# Patient Record
Sex: MALE | Race: WHITE | NOT HISPANIC OR LATINO | Employment: OTHER | URBAN - METROPOLITAN AREA
[De-identification: names, ages, dates, MRNs, and addresses within clinical notes are randomized per-mention and may not be internally consistent; named-entity substitution may affect disease eponyms.]

---

## 2023-07-20 PROBLEM — R06.09 DYSPNEA ON EXERTION: Status: ACTIVE | Noted: 2023-07-20

## 2023-07-20 PROBLEM — I50.22 CHRONIC SYSTOLIC CONGESTIVE HEART FAILURE (MULTI): Status: ACTIVE | Noted: 2023-07-20

## 2023-07-20 PROBLEM — I25.10 ARTERIOSCLEROSIS OF CORONARY ARTERY: Status: ACTIVE | Noted: 2023-07-20

## 2023-07-20 PROBLEM — Z96.652 STATUS POST TOTAL LEFT KNEE REPLACEMENT: Status: ACTIVE | Noted: 2023-07-20

## 2023-07-20 PROBLEM — E87.6 POTASSIUM DEFICIENCY: Status: ACTIVE | Noted: 2023-07-20

## 2023-07-20 PROBLEM — E11.9 TYPE 2 DIABETES MELLITUS (MULTI): Status: ACTIVE | Noted: 2023-07-20

## 2023-07-20 PROBLEM — G89.18 ACUTE POSTOPERATIVE PAIN: Status: ACTIVE | Noted: 2023-07-20

## 2023-07-20 PROBLEM — N43.3 HYDROCELE, RIGHT: Status: ACTIVE | Noted: 2023-07-20

## 2023-07-20 PROBLEM — R26.2 DIFFICULTY WALKING: Status: ACTIVE | Noted: 2023-07-20

## 2023-07-20 PROBLEM — J44.9 COPD (CHRONIC OBSTRUCTIVE PULMONARY DISEASE) (MULTI): Status: ACTIVE | Noted: 2023-07-20

## 2023-07-20 PROBLEM — M25.562 CHRONIC PAIN OF LEFT KNEE: Status: ACTIVE | Noted: 2023-07-20

## 2023-07-20 PROBLEM — M17.10 ARTHRITIS OF KNEE: Status: ACTIVE | Noted: 2023-07-20

## 2023-07-20 PROBLEM — G47.8 NON-RESTORATIVE SLEEP: Status: ACTIVE | Noted: 2023-07-20

## 2023-07-20 PROBLEM — G89.29 CHRONIC PAIN OF LEFT KNEE: Status: ACTIVE | Noted: 2023-07-20

## 2023-07-20 PROBLEM — I42.9 CARDIOMYOPATHY (MULTI): Status: ACTIVE | Noted: 2023-07-20

## 2023-07-20 PROBLEM — L03.115 CELLULITIS OF LEG WITHOUT FOOT, RIGHT: Status: ACTIVE | Noted: 2023-07-20

## 2023-07-20 PROBLEM — I42.0 DILATED CARDIOMYOPATHY (MULTI): Status: ACTIVE | Noted: 2023-07-20

## 2023-07-20 PROBLEM — Z95.0 PACEMAKER: Status: ACTIVE | Noted: 2023-07-20

## 2023-07-20 PROBLEM — G47.30 SLEEP APNEA: Status: ACTIVE | Noted: 2023-07-20

## 2023-07-20 PROBLEM — E78.00 HYPERCHOLESTEROLEMIA: Status: ACTIVE | Noted: 2023-07-20

## 2023-07-20 PROBLEM — R06.83 SNORING: Status: ACTIVE | Noted: 2023-07-20

## 2023-07-20 PROBLEM — K40.90 RIGHT INGUINAL HERNIA: Status: ACTIVE | Noted: 2023-07-20

## 2023-07-20 RX ORDER — ATORVASTATIN CALCIUM 40 MG/1
1 TABLET, FILM COATED ORAL DAILY
COMMUNITY
Start: 2017-01-31 | End: 2023-07-21 | Stop reason: SDUPTHER

## 2023-07-20 RX ORDER — SPIRONOLACTONE 25 MG/1
1 TABLET ORAL DAILY
COMMUNITY
Start: 2017-01-31 | End: 2023-07-21 | Stop reason: SDUPTHER

## 2023-07-20 RX ORDER — GABAPENTIN 800 MG/1
TABLET ORAL
COMMUNITY
Start: 2020-12-29 | End: 2023-07-21 | Stop reason: SDUPTHER

## 2023-07-20 RX ORDER — ASPIRIN 81 MG/1
1 TABLET ORAL DAILY
COMMUNITY
Start: 2020-07-13 | End: 2023-07-21 | Stop reason: ALTCHOICE

## 2023-07-20 RX ORDER — DIGOXIN 125 MCG
1 TABLET ORAL DAILY
COMMUNITY
Start: 2018-01-15 | End: 2023-07-21 | Stop reason: SDUPTHER

## 2023-07-20 RX ORDER — GLYCOPYRROLATE AND FORMOTEROL FUMARATE 9; 4.8 UG/1; UG/1
AEROSOL, METERED RESPIRATORY (INHALATION)
COMMUNITY
Start: 2020-09-29 | End: 2023-07-21 | Stop reason: SDUPTHER

## 2023-07-20 RX ORDER — POTASSIUM CHLORIDE 1500 MG/1
TABLET, EXTENDED RELEASE ORAL
COMMUNITY
End: 2023-07-21 | Stop reason: ALTCHOICE

## 2023-07-20 RX ORDER — FUROSEMIDE 20 MG/1
1 TABLET ORAL 2 TIMES DAILY
COMMUNITY
Start: 2017-01-31 | End: 2023-07-21 | Stop reason: ALTCHOICE

## 2023-07-20 RX ORDER — ALBUTEROL SULFATE 90 UG/1
AEROSOL, METERED RESPIRATORY (INHALATION)
COMMUNITY
Start: 2020-09-29 | End: 2023-07-21 | Stop reason: SDUPTHER

## 2023-07-20 RX ORDER — CARVEDILOL 25 MG/1
1 TABLET ORAL
COMMUNITY
Start: 2017-01-31 | End: 2023-07-21 | Stop reason: SDUPTHER

## 2023-07-20 RX ORDER — LISINOPRIL 40 MG/1
1 TABLET ORAL DAILY
COMMUNITY
Start: 2017-01-31 | End: 2023-07-21 | Stop reason: SDUPTHER

## 2023-07-20 RX ORDER — CYCLOBENZAPRINE HCL 10 MG
TABLET ORAL
COMMUNITY
Start: 2021-05-11 | End: 2023-07-21 | Stop reason: SDUPTHER

## 2023-07-20 RX ORDER — POTASSIUM CHLORIDE 20 MEQ/1
1 TABLET, EXTENDED RELEASE ORAL 2 TIMES DAILY
COMMUNITY
End: 2023-07-21 | Stop reason: SDUPTHER

## 2023-07-21 ENCOUNTER — OFFICE VISIT (OUTPATIENT)
Dept: PRIMARY CARE | Facility: CLINIC | Age: 58
End: 2023-07-21
Payer: COMMERCIAL

## 2023-07-21 VITALS
DIASTOLIC BLOOD PRESSURE: 80 MMHG | RESPIRATION RATE: 16 BRPM | HEART RATE: 49 BPM | HEIGHT: 76 IN | TEMPERATURE: 97.7 F | OXYGEN SATURATION: 93 % | WEIGHT: 229.8 LBS | SYSTOLIC BLOOD PRESSURE: 140 MMHG | BODY MASS INDEX: 27.98 KG/M2

## 2023-07-21 DIAGNOSIS — J44.9 CHRONIC OBSTRUCTIVE PULMONARY DISEASE, UNSPECIFIED COPD TYPE (MULTI): ICD-10-CM

## 2023-07-21 DIAGNOSIS — E87.6 POTASSIUM DEFICIENCY: ICD-10-CM

## 2023-07-21 DIAGNOSIS — E11.9 TYPE 2 DIABETES MELLITUS WITHOUT COMPLICATION, WITHOUT LONG-TERM CURRENT USE OF INSULIN (MULTI): Primary | ICD-10-CM

## 2023-07-21 DIAGNOSIS — I50.22 CHRONIC SYSTOLIC CONGESTIVE HEART FAILURE (MULTI): ICD-10-CM

## 2023-07-21 DIAGNOSIS — E78.00 HYPERCHOLESTEROLEMIA: ICD-10-CM

## 2023-07-21 DIAGNOSIS — I10 ESSENTIAL HYPERTENSION: ICD-10-CM

## 2023-07-21 DIAGNOSIS — E11.10 TYPE 2 DIABETES MELLITUS WITH KETOACIDOSIS WITHOUT COMA, WITH LONG-TERM CURRENT USE OF INSULIN (MULTI): ICD-10-CM

## 2023-07-21 DIAGNOSIS — I25.10 ARTERIOSCLEROSIS OF CORONARY ARTERY: ICD-10-CM

## 2023-07-21 DIAGNOSIS — M25.562 CHRONIC PAIN OF LEFT KNEE: ICD-10-CM

## 2023-07-21 DIAGNOSIS — Z12.5 SCREENING PSA (PROSTATE SPECIFIC ANTIGEN): ICD-10-CM

## 2023-07-21 DIAGNOSIS — G89.29 CHRONIC PAIN OF LEFT KNEE: ICD-10-CM

## 2023-07-21 DIAGNOSIS — Z79.4 TYPE 2 DIABETES MELLITUS WITH KETOACIDOSIS WITHOUT COMA, WITH LONG-TERM CURRENT USE OF INSULIN (MULTI): ICD-10-CM

## 2023-07-21 PROBLEM — T40.601A: Status: ACTIVE | Noted: 2023-07-21

## 2023-07-21 PROCEDURE — 3077F SYST BP >= 140 MM HG: CPT | Performed by: FAMILY MEDICINE

## 2023-07-21 PROCEDURE — 3079F DIAST BP 80-89 MM HG: CPT | Performed by: FAMILY MEDICINE

## 2023-07-21 PROCEDURE — 99214 OFFICE O/P EST MOD 30 MIN: CPT | Performed by: FAMILY MEDICINE

## 2023-07-21 PROCEDURE — 4010F ACE/ARB THERAPY RXD/TAKEN: CPT | Performed by: FAMILY MEDICINE

## 2023-07-21 RX ORDER — CYCLOBENZAPRINE HCL 10 MG
10 TABLET ORAL NIGHTLY
Qty: 90 TABLET | Refills: 1 | Status: SHIPPED | OUTPATIENT
Start: 2023-07-21 | End: 2024-03-12 | Stop reason: SDUPTHER

## 2023-07-21 RX ORDER — POTASSIUM CHLORIDE 20 MEQ/1
20 TABLET, EXTENDED RELEASE ORAL 2 TIMES DAILY
Qty: 180 TABLET | Refills: 1 | Status: SHIPPED | OUTPATIENT
Start: 2023-07-21 | End: 2023-11-08 | Stop reason: ALTCHOICE

## 2023-07-21 RX ORDER — GABAPENTIN 800 MG/1
800 TABLET ORAL
Qty: 90 TABLET | Refills: 1 | Status: SHIPPED | OUTPATIENT
Start: 2023-07-21 | End: 2024-03-12 | Stop reason: SDUPTHER

## 2023-07-21 RX ORDER — METFORMIN HYDROCHLORIDE 500 MG/1
500 TABLET ORAL
Qty: 30 TABLET | Refills: 3 | Status: SHIPPED | OUTPATIENT
Start: 2023-07-21 | End: 2023-11-13

## 2023-07-21 RX ORDER — LISINOPRIL 40 MG/1
40 TABLET ORAL DAILY
Qty: 90 TABLET | Refills: 1 | Status: SHIPPED | OUTPATIENT
Start: 2023-07-21

## 2023-07-21 RX ORDER — GLYCOPYRROLATE AND FORMOTEROL FUMARATE 9; 4.8 UG/1; UG/1
2 AEROSOL, METERED RESPIRATORY (INHALATION) DAILY
Qty: 10.7 G | Refills: 2 | Status: SHIPPED | OUTPATIENT
Start: 2023-07-21 | End: 2024-03-17 | Stop reason: ALTCHOICE

## 2023-07-21 RX ORDER — DIGOXIN 125 MCG
125 TABLET ORAL DAILY
Qty: 90 TABLET | Refills: 1 | Status: SHIPPED | OUTPATIENT
Start: 2023-07-21 | End: 2024-02-20 | Stop reason: ALTCHOICE

## 2023-07-21 RX ORDER — PIOGLITAZONEHYDROCHLORIDE 15 MG/1
15 TABLET ORAL DAILY
Qty: 30 TABLET | Refills: 3 | Status: SHIPPED | OUTPATIENT
Start: 2023-07-21 | End: 2023-11-13

## 2023-07-21 RX ORDER — ALBUTEROL SULFATE 90 UG/1
2 AEROSOL, METERED RESPIRATORY (INHALATION) EVERY 6 HOURS PRN
Qty: 18 G | Refills: 2 | Status: SHIPPED | OUTPATIENT
Start: 2023-07-21 | End: 2023-10-09

## 2023-07-21 RX ORDER — ATORVASTATIN CALCIUM 40 MG/1
40 TABLET, FILM COATED ORAL DAILY
Qty: 90 TABLET | Refills: 1 | Status: SHIPPED | OUTPATIENT
Start: 2023-07-21 | End: 2024-05-06

## 2023-07-21 RX ORDER — SPIRONOLACTONE 25 MG/1
25 TABLET ORAL DAILY
Qty: 90 TABLET | Refills: 1 | Status: SHIPPED | OUTPATIENT
Start: 2023-07-21

## 2023-07-21 RX ORDER — GLIMEPIRIDE 1 MG/1
1 TABLET ORAL
Qty: 30 TABLET | Refills: 3 | Status: SHIPPED | OUTPATIENT
Start: 2023-07-21 | End: 2023-11-13

## 2023-07-21 RX ORDER — CARVEDILOL 25 MG/1
25 TABLET ORAL
Qty: 180 TABLET | Refills: 1 | Status: SHIPPED | OUTPATIENT
Start: 2023-07-21 | End: 2023-12-19 | Stop reason: SDUPTHER

## 2023-07-21 ASSESSMENT — PATIENT HEALTH QUESTIONNAIRE - PHQ9
1. LITTLE INTEREST OR PLEASURE IN DOING THINGS: NOT AT ALL
2. FEELING DOWN, DEPRESSED OR HOPELESS: NOT AT ALL
SUM OF ALL RESPONSES TO PHQ9 QUESTIONS 1 AND 2: 0

## 2023-07-21 NOTE — PROGRESS NOTES
"Subjective   Patient ID: Armaan De Jesus is a 58 y.o. male who presents for Hypertension, Hyperlipidemia, and Diabetes.    HPI    Patient presents today for HTN follow up.  He does try to stick to low sodium, low cholesterol diet. Does not exercise, other than working. Denies SOB or chest pain.     Taking current medications which were reviewed.  Problem list discussed.    Overall doing well.  Eating okay.  Staying active.    Has no other new problem /question.     ROS  Constitutional- No activity change. No appetite change.  Eyes- Denies vision changes.  Respiratory- No shortness of breath.  Cardiovascular- No palpitations. No chest pain.  GI- No nausea or vomiting. No diarrhea or constipation. Denies abdominal pain.  Musculoskeletal- Denies joint swelling.  Extremities- No edema.  Neurological- Denies headaches. Denies dizziness.  Skin- No rashes.  Psychiatric/Behavioral- Denies significant anxiety, or depressed mood.     Objective     /80 (BP Location: Left arm, Patient Position: Sitting, BP Cuff Size: Adult)   Pulse (!) 49   Temp 36.5 °C (97.7 °F) (Temporal)   Resp 16   Ht 1.93 m (6' 4\")   Wt 104 kg (229 lb 12.8 oz)   SpO2 93%   BMI 27.97 kg/m²     No Known Allergies    Constitutional-- Well-nourished.  No distress  Head- unremarkable.  Eyes- PERRL.  Conjunctiva normal.  Nose- Normal.  No rhinorrhea noted.  Throat- Oropharynx is clear and moist.  Neck- Supple with no thyromegaly.  No significant cervical adenopathy noted.  Pulmonary/Chest-mildly diminished breath sounds consistent with COPD  Heart- Regular rate and rhythm.  No murmur.  Abdomen- Soft and non-tender.  No masses noted.  Musculoskeletal-OA changes hands and knees noted  Extremities- No edema.   Neurological- Alert.  No noted deficits.  Skin- Warm.  No rashes.  Psychiatric/Behavioral- Mood and affect normal.  Behavior normal.     Assessment/Plan   1. Type 2 diabetes mellitus without complication, without long-term current use of insulin " (CMS/HCC) controlled CBC and Auto Differential    Comprehensive Metabolic Panel    Lipid Panel    Hemoglobin A1C    glimepiride (AmaryL) 1 mg tablet    pioglitazone (Actos) 15 mg tablet    metFORMIN (Glucophage) 500 mg tablet      2. Arteriosclerosis of coronary artery stable CBC and Auto Differential    Comprehensive Metabolic Panel      3. Hypercholesterolemia stable Comprehensive Metabolic Panel    Lipid Panel    atorvastatin (Lipitor) 40 mg tablet    digoxin (Lanoxin) 125 MCG tablet      4. Screening PSA (prostate specific antigen)  Prostate Specific Antigen, Screen      5. Chronic obstructive pulmonary disease, unspecified COPD type (CMS/HCC) stable glycopyrrolate-formoteroL (Bevespi Aerosphere) 9-4.8 mcg HFA aerosol inhaler    albuterol (Ventolin HFA) 90 mcg/actuation inhaler      6. Potassium deficiency  potassium chloride CR (Klor-Con M20) 20 mEq ER tablet      7. Essential hypertension controlled carvedilol (Coreg) 25 mg tablet    lisinopril 40 mg tablet    spironolactone (Aldactone) 25 mg tablet      8. Chronic pain of left knee  cyclobenzaprine (Flexeril) 10 mg tablet    gabapentin (Neurontin) 800 mg tablet      9. Chronic systolic congestive heart failure (CMS/HCC)        10. Type 2 diabetes mellitus with ketoacidosis without coma, with long-term current use of insulin (CMS/HCC)               Long talk. Treatment options reviewed.  Labs as ordered  Continue and take your medications as prescribed.    Health Maintenance issues discussed.    Importance of healthy diet and regular exercise regimen discussed.    We will contact you with any test results ordered. If you do not hear from us, please contact.    Follow-up as instructed or sooner if any problems or symptoms do not resolve as expected.

## 2023-08-23 ENCOUNTER — APPOINTMENT (OUTPATIENT)
Dept: PRIMARY CARE | Facility: CLINIC | Age: 58
End: 2023-08-23
Payer: COMMERCIAL

## 2023-09-12 ENCOUNTER — LAB (OUTPATIENT)
Dept: LAB | Facility: LAB | Age: 58
End: 2023-09-12
Payer: COMMERCIAL

## 2023-09-12 ENCOUNTER — OFFICE VISIT (OUTPATIENT)
Dept: PRIMARY CARE | Facility: CLINIC | Age: 58
End: 2023-09-12
Payer: COMMERCIAL

## 2023-09-12 VITALS
HEIGHT: 76 IN | HEART RATE: 70 BPM | WEIGHT: 235.8 LBS | OXYGEN SATURATION: 98 % | RESPIRATION RATE: 18 BRPM | TEMPERATURE: 97.9 F | SYSTOLIC BLOOD PRESSURE: 120 MMHG | DIASTOLIC BLOOD PRESSURE: 80 MMHG | BODY MASS INDEX: 28.71 KG/M2

## 2023-09-12 DIAGNOSIS — G47.30 SLEEP APNEA, UNSPECIFIED TYPE: ICD-10-CM

## 2023-09-12 DIAGNOSIS — E11.8 DM TYPE 2, CONTROLLED, WITH COMPLICATION (MULTI): ICD-10-CM

## 2023-09-12 DIAGNOSIS — E78.00 HYPERCHOLESTEROLEMIA: ICD-10-CM

## 2023-09-12 DIAGNOSIS — I10 ESSENTIAL HYPERTENSION: ICD-10-CM

## 2023-09-12 DIAGNOSIS — Z12.5 SCREENING PSA (PROSTATE SPECIFIC ANTIGEN): ICD-10-CM

## 2023-09-12 DIAGNOSIS — I25.10 ARTERIOSCLEROSIS OF CORONARY ARTERY: ICD-10-CM

## 2023-09-12 DIAGNOSIS — T40.601A: ICD-10-CM

## 2023-09-12 DIAGNOSIS — I42.9 CARDIOMYOPATHY, UNSPECIFIED TYPE (MULTI): Primary | ICD-10-CM

## 2023-09-12 DIAGNOSIS — E11.9 TYPE 2 DIABETES MELLITUS WITHOUT COMPLICATION, WITHOUT LONG-TERM CURRENT USE OF INSULIN (MULTI): ICD-10-CM

## 2023-09-12 DIAGNOSIS — J44.9 CHRONIC OBSTRUCTIVE PULMONARY DISEASE, UNSPECIFIED COPD TYPE (MULTI): ICD-10-CM

## 2023-09-12 LAB
ALANINE AMINOTRANSFERASE (SGPT) (U/L) IN SER/PLAS: 13 U/L (ref 10–52)
ALBUMIN (G/DL) IN SER/PLAS: 4.3 G/DL (ref 3.4–5)
ALKALINE PHOSPHATASE (U/L) IN SER/PLAS: 49 U/L (ref 33–136)
ANION GAP IN SER/PLAS: 11 MMOL/L (ref 10–20)
ASPARTATE AMINOTRANSFERASE (SGOT) (U/L) IN SER/PLAS: 16 U/L (ref 9–39)
BASOPHILS (10*3/UL) IN BLOOD BY AUTOMATED COUNT: 0.05 X10E9/L (ref 0–0.1)
BASOPHILS/100 LEUKOCYTES IN BLOOD BY AUTOMATED COUNT: 0.6 % (ref 0–2)
BILIRUBIN TOTAL (MG/DL) IN SER/PLAS: 0.5 MG/DL (ref 0–1.2)
CALCIUM (MG/DL) IN SER/PLAS: 10.8 MG/DL (ref 8.6–10.3)
CARBON DIOXIDE, TOTAL (MMOL/L) IN SER/PLAS: 34 MMOL/L (ref 21–32)
CHLORIDE (MMOL/L) IN SER/PLAS: 99 MMOL/L (ref 98–107)
CHOLESTEROL (MG/DL) IN SER/PLAS: 142 MG/DL (ref 0–199)
CHOLESTEROL IN HDL (MG/DL) IN SER/PLAS: 48.8 MG/DL
CHOLESTEROL/HDL RATIO: 2.9
CREATININE (MG/DL) IN SER/PLAS: 0.98 MG/DL (ref 0.5–1.3)
EOSINOPHILS (10*3/UL) IN BLOOD BY AUTOMATED COUNT: 0.31 X10E9/L (ref 0–0.7)
EOSINOPHILS/100 LEUKOCYTES IN BLOOD BY AUTOMATED COUNT: 3.8 % (ref 0–6)
ERYTHROCYTE DISTRIBUTION WIDTH (RATIO) BY AUTOMATED COUNT: 13.1 % (ref 11.5–14.5)
ERYTHROCYTE MEAN CORPUSCULAR HEMOGLOBIN CONCENTRATION (G/DL) BY AUTOMATED: 31 G/DL (ref 32–36)
ERYTHROCYTE MEAN CORPUSCULAR VOLUME (FL) BY AUTOMATED COUNT: 95 FL (ref 80–100)
ERYTHROCYTES (10*6/UL) IN BLOOD BY AUTOMATED COUNT: 4.93 X10E12/L (ref 4.5–5.9)
ESTIMATED AVERAGE GLUCOSE FOR HBA1C: 174 MG/DL
GFR MALE: 88 ML/MIN/1.73M2
GLUCOSE (MG/DL) IN SER/PLAS: 108 MG/DL (ref 74–99)
HEMATOCRIT (%) IN BLOOD BY AUTOMATED COUNT: 46.8 % (ref 41–52)
HEMOGLOBIN (G/DL) IN BLOOD: 14.5 G/DL (ref 13.5–17.5)
HEMOGLOBIN A1C/HEMOGLOBIN TOTAL IN BLOOD: 7.7 %
IMMATURE GRANULOCYTES/100 LEUKOCYTES IN BLOOD BY AUTOMATED COUNT: 0.4 % (ref 0–0.9)
LDL: 53 MG/DL (ref 0–99)
LEUKOCYTES (10*3/UL) IN BLOOD BY AUTOMATED COUNT: 8.1 X10E9/L (ref 4.4–11.3)
LYMPHOCYTES (10*3/UL) IN BLOOD BY AUTOMATED COUNT: 2.66 X10E9/L (ref 1.2–4.8)
LYMPHOCYTES/100 LEUKOCYTES IN BLOOD BY AUTOMATED COUNT: 32.8 % (ref 13–44)
MONOCYTES (10*3/UL) IN BLOOD BY AUTOMATED COUNT: 0.85 X10E9/L (ref 0.1–1)
MONOCYTES/100 LEUKOCYTES IN BLOOD BY AUTOMATED COUNT: 10.5 % (ref 2–10)
NEUTROPHILS (10*3/UL) IN BLOOD BY AUTOMATED COUNT: 4.2 X10E9/L (ref 1.2–7.7)
NEUTROPHILS/100 LEUKOCYTES IN BLOOD BY AUTOMATED COUNT: 51.9 % (ref 40–80)
PLATELETS (10*3/UL) IN BLOOD AUTOMATED COUNT: 214 X10E9/L (ref 150–450)
POTASSIUM (MMOL/L) IN SER/PLAS: 4.8 MMOL/L (ref 3.5–5.3)
PROSTATE SPECIFIC ANTIGEN,SCREEN: 0.51 NG/ML (ref 0–4)
PROTEIN TOTAL: 6.9 G/DL (ref 6.4–8.2)
SODIUM (MMOL/L) IN SER/PLAS: 139 MMOL/L (ref 136–145)
TRIGLYCERIDE (MG/DL) IN SER/PLAS: 199 MG/DL (ref 0–149)
UREA NITROGEN (MG/DL) IN SER/PLAS: 16 MG/DL (ref 6–23)
VLDL: 40 MG/DL (ref 0–40)

## 2023-09-12 PROCEDURE — 4010F ACE/ARB THERAPY RXD/TAKEN: CPT | Performed by: FAMILY MEDICINE

## 2023-09-12 PROCEDURE — 85025 COMPLETE CBC W/AUTO DIFF WBC: CPT

## 2023-09-12 PROCEDURE — 99213 OFFICE O/P EST LOW 20 MIN: CPT | Performed by: FAMILY MEDICINE

## 2023-09-12 PROCEDURE — 80053 COMPREHEN METABOLIC PANEL: CPT

## 2023-09-12 PROCEDURE — 83036 HEMOGLOBIN GLYCOSYLATED A1C: CPT

## 2023-09-12 PROCEDURE — 80061 LIPID PANEL: CPT

## 2023-09-12 PROCEDURE — 3051F HG A1C>EQUAL 7.0%<8.0%: CPT | Performed by: FAMILY MEDICINE

## 2023-09-12 PROCEDURE — 3079F DIAST BP 80-89 MM HG: CPT | Performed by: FAMILY MEDICINE

## 2023-09-12 PROCEDURE — 84153 ASSAY OF PSA TOTAL: CPT

## 2023-09-12 PROCEDURE — 36415 COLL VENOUS BLD VENIPUNCTURE: CPT

## 2023-09-12 PROCEDURE — 3074F SYST BP LT 130 MM HG: CPT | Performed by: FAMILY MEDICINE

## 2023-09-12 RX ORDER — BLOOD SUGAR DIAGNOSTIC
STRIP MISCELLANEOUS
COMMUNITY
End: 2023-09-12 | Stop reason: SDUPTHER

## 2023-09-12 RX ORDER — BLOOD SUGAR DIAGNOSTIC
STRIP MISCELLANEOUS
Qty: 100 STRIP | Refills: 1 | Status: SHIPPED | OUTPATIENT
Start: 2023-09-12 | End: 2024-03-12 | Stop reason: SDUPTHER

## 2023-09-12 NOTE — PROGRESS NOTES
"Subjective   Patient ID: Armaan De Jesus is a 58 y.o. male who presents for Diabetes and Hypertension.  HPI    DM  Does check glucose at home. Today glucose was 112. Eats a generally healthy diet, Exercises. Currently taking metformin, pioglitazone and glimepiride. . Last A1c on 9-23-21 @ 7.1 %. Last eye exam unknown. Does not see a Podiatrist.      Patient presents today for HTN follow up.  He does try to stick to low sodium, low cholesterol diet. Does not exercise, other than working. Denies SOB or chest pain.     Taking current medications which were reviewed.  Problem list discussed.    Overall doing well.  Eating okay.  Staying active.    Has no other new problem /question.     ROS  Constitutional- No activity change. No appetite change.  Eyes- Denies vision changes.  Respiratory- No shortness of breath.  Cardiovascular- No palpitations. No chest pain.  GI- No nausea or vomiting. No diarrhea or constipation. Denies abdominal pain.  Musculoskeletal- Denies joint swelling.  Extremities- No edema.  Neurological- Denies headaches. Denies dizziness.  Skin- No rashes.  Psychiatric/Behavioral- Denies significant anxiety, or depressed mood.     Objective     /80   Pulse 70   Temp 36.6 °C (97.9 °F) (Temporal)   Resp 18   Ht 1.93 m (6' 4\")   Wt 107 kg (235 lb 12.8 oz)   SpO2 98%   BMI 28.70 kg/m²     No Known Allergies    Constitutional-- Well-nourished.  No distress  Head- unremarkable.  Eyes- PERRL.  Conjunctiva normal.  Nose- Normal.  No rhinorrhea noted.  Throat- Oropharynx is clear and moist.  Neck- Supple with no thyromegaly.  No significant cervical adenopathy noted.  Pulmonary/Chest- Breath sounds normal with normal effort.  No wheezing.  Heart- Regular rate and rhythm.  No murmur.  Abdomen- Soft and non-tender.  No masses noted.  Musculoskeletal- Normal ROM.  No significant joint swelling  Extremities- No edema.   Neurological- Alert.  No noted deficits.  Skin- Warm.  No " alisa.  Psychiatric/Behavioral- Mood and affect normal.  Behavior normal.     Assessment/Plan   1. Cardiomyopathy, unspecified type (CMS/[facility])        2. Type 2 diabetes mellitus without complication, without long-term current use of insulin (CMS/[facility])  blood sugar diagnostic (FreeStyle Test) strip      3. Essential hypertension        4. Chronic obstructive pulmonary disease, unspecified COPD type (CMS/[facility])        5. Hypercholesterolemia        6. Poisoning by unspecified narcotics, accidental (unintentional), initial encounter (CMS/[facility])        7. DM type 2, controlled, with complication (CMS/[facility])        8. Sleep apnea, unspecified type               Long talk. Treatment options reviewed.    Diabetes Mellitus controlled. Continue to monitor glucose levels. Educated on diabetes, low-calorie diet and exercise. Recommended eye exam once a year. Educated on diabetic foot care.    Hypertension controlled.     Educated on COPD.    Continue and take your medications as prescribed.    Health Maintenance issues discussed.    Importance of healthy diet and regular exercise regimen discussed.    We will contact you with any test results ordered. If you do not hear from us, please contact.    Follow-up as instructed or sooner if any problems or symptoms do not resolve as expected.      Scribe Attestation  By signing my name below, Libby STEWART Scribe   attest that this documentation has been prepared under the direction and in the presence of Malik Elaine MD.

## 2023-10-08 DIAGNOSIS — J44.9 CHRONIC OBSTRUCTIVE PULMONARY DISEASE, UNSPECIFIED COPD TYPE (MULTI): ICD-10-CM

## 2023-10-09 RX ORDER — ALBUTEROL SULFATE 90 UG/1
2 AEROSOL, METERED RESPIRATORY (INHALATION) EVERY 6 HOURS PRN
Qty: 18 G | Refills: 5 | Status: SHIPPED | OUTPATIENT
Start: 2023-10-09 | End: 2024-04-01

## 2023-10-25 PROBLEM — K59.00 CONSTIPATION: Status: ACTIVE | Noted: 2023-10-25

## 2023-10-25 PROBLEM — R60.9 EDEMA: Status: ACTIVE | Noted: 2023-10-25

## 2023-10-25 PROBLEM — B19.20 HCV (HEPATITIS C VIRUS): Status: ACTIVE | Noted: 2023-10-25

## 2023-10-25 RX ORDER — POLYETHYLENE GLYCOL 3350, SODIUM SULFATE ANHYDROUS, SODIUM BICARBONATE, SODIUM CHLORIDE, POTASSIUM CHLORIDE 236; 22.74; 6.74; 5.86; 2.97 G/4L; G/4L; G/4L; G/4L; G/4L
POWDER, FOR SOLUTION ORAL
COMMUNITY
Start: 2021-05-28 | End: 2023-11-08 | Stop reason: ALTCHOICE

## 2023-10-25 RX ORDER — EMPAGLIFLOZIN 10 MG/1
1 TABLET, FILM COATED ORAL DAILY
COMMUNITY
Start: 2023-07-27 | End: 2023-11-08

## 2023-10-25 RX ORDER — TRAMADOL HYDROCHLORIDE 50 MG/1
TABLET ORAL
COMMUNITY
Start: 2021-12-03 | End: 2024-03-12 | Stop reason: ALTCHOICE

## 2023-10-25 RX ORDER — SULFAMETHOXAZOLE AND TRIMETHOPRIM 800; 160 MG/1; MG/1
TABLET ORAL
COMMUNITY
Start: 2022-06-21 | End: 2023-11-08 | Stop reason: ALTCHOICE

## 2023-10-25 RX ORDER — PREGABALIN 100 MG/1
CAPSULE ORAL
COMMUNITY
Start: 2021-02-24 | End: 2023-11-08 | Stop reason: ALTCHOICE

## 2023-10-25 RX ORDER — ACETAMINOPHEN 325 MG/1
TABLET ORAL
COMMUNITY
Start: 2022-04-30 | End: 2024-03-12 | Stop reason: ALTCHOICE

## 2023-10-26 ENCOUNTER — APPOINTMENT (OUTPATIENT)
Dept: CARDIOLOGY | Facility: CLINIC | Age: 58
End: 2023-10-26
Payer: COMMERCIAL

## 2023-11-02 ENCOUNTER — APPOINTMENT (OUTPATIENT)
Dept: CARDIOLOGY | Facility: CLINIC | Age: 58
End: 2023-11-02
Payer: COMMERCIAL

## 2023-11-02 NOTE — PROGRESS NOTES
Name : Armaan De Jesus   : 1965   MRN : [id number]   ENC Date : 2023    CC: No chief complaint on file.     HPI:    Mr. Butler is a 57 y/o  male with PMHx sig for NIDCM, Chronic Systolic Heart Failure EF***, ICD in situ, CAD (mild per [facility] 2016), COPD, Active smoker    Seeing pulm?  Seeing EP?    years old white gentleman with history of nonischemic dilated cardiomyopathy and mild coronary atherosclerosis per catheterization early . His LV EF is estimated to be 20% or less, on an echo late of . Early 2018, he was hospitalized with acute on chronic systolic congestive heart failure. He has long-standing history of smoking remaining an active smoker and is known to have COPD but has contributed to his chronic exertional dyspnea. He also has an ICD implant in place.     He has done very well since his last office visit and reports no major health issues and no hospitalizations. He remains physically active mostly walking around the house denying any significant complaints other than his chronic exertional dyspnea that is also related to his advanced COPD. He continues to smoke. He denies orthopnea, PND, lower extremity swelling and denies chest pain, palpitations, fainting or syncope. He has had no ICD shocks. He is compliant with his medications reports no side effects.     ROS: unless otherwise noted in the history of present illness, all other systems were reviewed and they are negative for complaints     Allergies:  Patient has no known allergies.    Current Outpatient Medications   Medication Instructions    acetaminophen (Tylenol) 325 mg tablet acetaminophen 325 mg oral tablet ; 3 tab(s) orally every 8 hours as needed for pain Quantity: 60 Refills: 0 Ordered: 2022 Sukhwinder Stafford Start: 2022 Generic Substitution Allowed Comments: This product contains acetaminophen. Do not use with any other product containing acetaminophen to prevent possible liver damage.    aclidinium  (Tudorza) 400 mcg/actuation inhaler Tudorza Pressair 400 MCG/ACT Inhalation Aerosol Powder Breath Activated INHALE 1 PUFFS Twice daily Quantity: 1 Refills: 3 Ordered: 10-Sep-2018 Fernando Cantu MD Start : 31-Jan-2017 Active    albuterol 90 mcg/actuation inhaler 2 puffs, inhalation, Every 6 hours PRN    atorvastatin (LIPITOR) 40 mg, oral, Daily    blood sugar diagnostic (FreeStyle Test) strip Use as directed.    carvedilol (COREG) 25 mg, oral, 2 times daily with meals    cyclobenzaprine (FLEXERIL) 10 mg, oral, Nightly    digoxin (LANOXIN) 125 mcg, oral, Daily    gabapentin (NEURONTIN) 800 mg, oral, Daily RT    glimepiride (AMARYL) 1 mg, oral, Daily before breakfast    glycopyrrolate-formoteroL (Bevespi Aerosphere) 9-4.8 mcg HFA aerosol inhaler 2 puffs, inhalation, Daily    Jardiance 10 mg 1 tablet, oral, Daily    lisinopril 40 mg, oral, Daily    metFORMIN (GLUCOPHAGE) 500 mg, oral, Daily with breakfast    pioglitazone (ACTOS) 15 mg, oral, Daily    polyethylene glycol-electrolytes (polyethylene glycol) 420 gram solution PEG-3350/Electrolytes 236 GM Oral Solution Reconstituted Take 1 bottle (4L) 2 days prior to procedure, take 2 L at 4 pm on day prior to procedure and 2 L at 2 am on the day of procedure. Quantity: 2 Refills: 0 Ordered: 28-May-2021 Abimbola Paris MD Start : 28-May-2021 Active    potassium chloride CR (Klor-Con M20) 20 mEq ER tablet 20 mEq, oral, 2 times daily    pregabalin (Lyrica) 100 mg capsule Pregabalin 100 MG Oral Capsule Quantity: 84 Refills: 0 Ordered: 24-Feb-2021 DO Start : 24-Feb-2021 Active    spironolactone (ALDACTONE) 25 mg, oral, Daily    sulfamethoxazole-trimethoprim (Bactrim DS) 800-160 mg tablet Sulfamethoxazole-Trimethoprim 800-160 MG Oral Tablet TAKE 1 TABLET TWICE DAILY WITH FOOD. Quantity: 14 Refills: 0 Ordered: 30-Jun-2022 Eliana Tripp Start : 21-Jun-2022 Active    traMADol (Ultram) 50 mg tablet traMADol HCl - 50 MG Oral Tablet TAKE 1 TABLET TWICE DAILY AS NEEDED.  Quantity: 20 Refills: 0 Ordered: 6-Jan-2022 Wing Castillo PA-C Start : 3-Dec-2021 Active        Last Labs:  CBC -  Lab Results   Component Value Date    WBC 8.1 09/12/2023    HGB 14.5 09/12/2023    HCT 46.8 09/12/2023    MCV 95 09/12/2023     09/12/2023       CMP -  Lab Results   Component Value Date    CALCIUM 10.8 (H) 09/12/2023    PROT 6.9 09/12/2023    ALBUMIN 4.3 09/12/2023    AST 16 09/12/2023    ALT 13 09/12/2023    ALKPHOS 49 09/12/2023    BILITOT 0.5 09/12/2023       LIPID PANEL -   Lab Results   Component Value Date    CHOL 142 09/12/2023    TRIG 199 (H) 09/12/2023    HDL 48.8 09/12/2023    CHHDL 2.9 09/12/2023    LDLF 53 09/12/2023    VLDL 40 09/12/2023    NHDL 91 05/04/2021       RENAL FUNCTION PANEL -   Lab Results   Component Value Date    GLUCOSE 108 (H) 09/12/2023     09/12/2023    K 4.8 09/12/2023    CL 99 09/12/2023    CO2 34 (H) 09/12/2023    ANIONGAP 11 09/12/2023    BUN 16 09/12/2023    CREATININE 0.98 09/12/2023    GFRMALE 88 09/12/2023    CALCIUM 10.8 (H) 09/12/2023    ALBUMIN 4.3 09/12/2023        Lab Results   Component Value Date    BNP 40 03/20/2020    HGBA1C 7.7 (A) 09/12/2023       Last Recorded Vitals:  There were no vitals filed for this visit.    Physical Exam:  ***    Last Cardiology Tests:  ECG: ***    Assessment/Plan:  Mr. De Jesus's clinical cardiac condition is fairly stable and his shortness of breath is attributed to the lack of medications/noncompliance. He is at NYHA-II. His chronic exertional dyspnea is partly related to his COPD, with his chronic smoking history. He now tells me that he has not seen a pulmonologist in the past. He has had no hemodynamic complaints and no ICD shocks. His blood pressure is within the desired range on the current medications. He has had no chest pain or angina and has had no amputations or fainting/syncope. He continues to smoke though expressed the intention to quit smoking prior to the end of the year. He has not had his BMP  done.     I have counseled him on his cardiac condition, long-term implications, the need for strict compliance with low-salt diet as well as medications with no interruptions. I am starting him on Jardiance 10 mg daily and will continue the rest of the medications though I will also stop the potassium supplement pending his blood work. He will follow-up with his PCP in a week, and potassium supplement can be addressed then. Otherwise, he is to follow-up with Mrs. Schwab in 3 months with a plan to obtain an echocardiogram in the meantime     Tracy M Schwab, APRN-CNP

## 2023-11-08 ENCOUNTER — OFFICE VISIT (OUTPATIENT)
Dept: CARDIOLOGY | Facility: CLINIC | Age: 58
End: 2023-11-08
Payer: COMMERCIAL

## 2023-11-08 VITALS
BODY MASS INDEX: 29.32 KG/M2 | SYSTOLIC BLOOD PRESSURE: 122 MMHG | DIASTOLIC BLOOD PRESSURE: 62 MMHG | OXYGEN SATURATION: 89 % | RESPIRATION RATE: 18 BRPM | HEART RATE: 56 BPM | WEIGHT: 240.8 LBS | HEIGHT: 76 IN

## 2023-11-08 DIAGNOSIS — I25.10 ARTERIOSCLEROSIS OF CORONARY ARTERY: ICD-10-CM

## 2023-11-08 DIAGNOSIS — I42.0 DILATED CARDIOMYOPATHY (MULTI): ICD-10-CM

## 2023-11-08 DIAGNOSIS — I10 PRIMARY HYPERTENSION: Chronic | ICD-10-CM

## 2023-11-08 DIAGNOSIS — Z95.0 PACEMAKER: ICD-10-CM

## 2023-11-08 DIAGNOSIS — I50.22 CHRONIC SYSTOLIC CONGESTIVE HEART FAILURE (MULTI): ICD-10-CM

## 2023-11-08 DIAGNOSIS — Z95.0 S/P PLACEMENT OF CARDIAC PACEMAKER: ICD-10-CM

## 2023-11-08 DIAGNOSIS — I42.9 CARDIOMYOPATHY, UNSPECIFIED TYPE (MULTI): Primary | ICD-10-CM

## 2023-11-08 PROCEDURE — 99215 OFFICE O/P EST HI 40 MIN: CPT | Performed by: NURSE PRACTITIONER

## 2023-11-08 PROCEDURE — 4010F ACE/ARB THERAPY RXD/TAKEN: CPT | Performed by: NURSE PRACTITIONER

## 2023-11-08 PROCEDURE — 3078F DIAST BP <80 MM HG: CPT | Performed by: NURSE PRACTITIONER

## 2023-11-08 PROCEDURE — 3074F SYST BP LT 130 MM HG: CPT | Performed by: NURSE PRACTITIONER

## 2023-11-08 PROCEDURE — 3051F HG A1C>EQUAL 7.0%<8.0%: CPT | Performed by: NURSE PRACTITIONER

## 2023-11-08 NOTE — PATIENT INSTRUCTIONS
- cardiac MRI  - get blood work today please. I'm checking your digoxin level  - I am going to have you establish care with Dr. Rodriguez. He is an electrophysiologist & will check/manage your pacemaker  - you will follow up with me after you MRI to review results & possibly make changes to your regimen    It was my pleasure to meet you. I look forward to being your cardiac Nurse Practitioner. I am a huge believer in communicating with my patients. Please contact me at any time, if anything is not clear to you regarding anything we have discussed, or if new questions occur to you.

## 2023-11-08 NOTE — PROGRESS NOTES
Name : Armaan De Jesus   : 1965   MRN : [id number]   ENC Date : 2023    CC: Cardiomyopathy, Coronary Artery Disease, and Heart Failure (3 MONTH F/U)     HPI:    Mr. Butler is a 57 y/o male with PMHx sig for NIDCM, Chronic Systolic Heart Failure EF 20% (echo 2017), s/p ICD, mild CAD ([facility] 2016), HTN, HLD, DM II, COPD & Active Smoker 1/2PPD who presents today for annual cardiovascular follow up.     Interval Hx:  His LV EF is estimated to be 20% or less, on an echo late of . Early 2018, he was hospitalized with acute on chronic systolic congestive heart failure.     He has done very well since his last visit. Reports no major health issues and has had no hospitalizations. Denies any chest pain, pressure, SOB/STEPHENSON, PND, orthopnea, LE edema, palpitations, lightheadedness, dizziness, or syncope at rest or with exertions.    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 Reports activity is limited by his legs. Knee replacement last year & feels like he is losing leg muscles. Sleeps on 1 pillow.    ROS: unless otherwise noted in the history of present illness, all other systems were reviewed and they are negative for complaints     Allergies:  Patient has no known allergies.    Current Outpatient Medications   Medication Instructions    acetaminophen (Tylenol) 325 mg tablet acetaminophen 325 mg oral tablet ; 3 tab(s) orally every 8 hours as needed for pain Quantity: 60 Refills: 0 Ordered: 2022 Sukhwinder Stafford Start: 2022 Generic Substitution Allowed Comments: This product contains acetaminophen. Do not use with any other product containing acetaminophen to prevent possible liver damage.    aclidinium (Tudorza) 400 mcg/actuation inhaler Tudorza Pressair 400 MCG/ACT Inhalation Aerosol Powder Breath Activated INHALE 1 PUFFS Twice daily Quantity: 1 Refills: 3 Ordered: 10-Sep-2018 Carmen Tafoya MD, Fernando Start : 2017 Active    albuterol 90 mcg/actuation inhaler 2  puffs, inhalation, Every 6 hours PRN    atorvastatin (LIPITOR) 40 mg, oral, Daily    blood sugar diagnostic (FreeStyle Test) strip Use as directed.    carvedilol (COREG) 25 mg, oral, 2 times daily with meals    cyclobenzaprine (FLEXERIL) 10 mg, oral, Nightly    digoxin (LANOXIN) 125 mcg, oral, Daily    gabapentin (NEURONTIN) 800 mg, oral, Daily RT    glimepiride (AMARYL) 1 mg, oral, Daily before breakfast    glycopyrrolate-formoteroL (Bevespi Aerosphere) 9-4.8 mcg HFA aerosol inhaler 2 puffs, inhalation, Daily    lisinopril 40 mg, oral, Daily    metFORMIN (GLUCOPHAGE) 500 mg, oral, Daily with breakfast    pioglitazone (ACTOS) 15 mg, oral, Daily    spironolactone (ALDACTONE) 25 mg, oral, Daily    traMADol (Ultram) 50 mg tablet traMADol HCl - 50 MG Oral Tablet TAKE 1 TABLET TWICE DAILY AS NEEDED. Quantity: 20 Refills: 0 Ordered: 6-Jan-2022 Wing Castillo PA-C Start : 3-Dec-2021 Active        Last Labs:  CBC -  Lab Results   Component Value Date    WBC 8.1 09/12/2023    HGB 14.5 09/12/2023    HCT 46.8 09/12/2023    MCV 95 09/12/2023     09/12/2023       CMP -  Lab Results   Component Value Date    CALCIUM 10.8 (H) 09/12/2023    PROT 6.9 09/12/2023    ALBUMIN 4.3 09/12/2023    AST 16 09/12/2023    ALT 13 09/12/2023    ALKPHOS 49 09/12/2023    BILITOT 0.5 09/12/2023       LIPID PANEL -   Lab Results   Component Value Date    CHOL 142 09/12/2023    TRIG 199 (H) 09/12/2023    HDL 48.8 09/12/2023    CHHDL 2.9 09/12/2023    LDLF 53 09/12/2023    VLDL 40 09/12/2023    NHDL 91 05/04/2021       RENAL FUNCTION PANEL -   Lab Results   Component Value Date    GLUCOSE 108 (H) 09/12/2023     09/12/2023    K 4.8 09/12/2023    CL 99 09/12/2023    CO2 34 (H) 09/12/2023    ANIONGAP 11 09/12/2023    BUN 16 09/12/2023    CREATININE 0.98 09/12/2023    GFRMALE 88 09/12/2023    CALCIUM 10.8 (H) 09/12/2023    ALBUMIN 4.3 09/12/2023        Lab Results   Component Value Date    BNP 40 03/20/2020    HGBA1C 7.7 (A) 09/12/2023  "      Last Recorded Vitals:  Vitals:    11/08/23 0933   BP: 122/62   BP Location: Left arm   Patient Position: Sitting   Pulse: 56   Resp: 18   SpO2: (!) 89%   Weight: 109 kg (240 lb 12.8 oz)   Height: 1.93 m (6' 4\")       Physical Exam:  On exam Mr. De Jesus appears his stated age, is alert and oriented x3, and in no acute distress. His sclera are anicteric and his oropharynx has moist mucous membranes. His neck is supple and without thyromegaly. The JVP is ~5 cm of water above the right atrium. His cardiac exam has regular rhythm, normal S1, S2. No S3/4. There are no murmurs. His lungs are clear to auscultation bilaterally and there is no dullness to percussion. His abdomen is soft, nontender with normoactive bowel sounds. There is no HJR. The extremities are warm and without edema. The skin is dry. There is no rash present. The distal pulses are 2-3+ in all four extremities. His mood and affect are appropriate for todays encounter.     Assessment/Plan:  Nonischemic Dilated Cardiomyopathy. HFrEF 20%. Stage C. NYHA Class I-II.  Last echo 2017. Etiology is unclear to me. No cMRI from what I can see.  - cMRI. If EF still severely reduced, will refer to Advanced HF clinic for further evaluation  - c/w Coreg 25mg BID  - c/w Lisinopril 40mg every day  - c/w Spironolactone 25mg every day  - c/w Digoxin 0.125mg every day; check a dig level  - Dr. Garcia started jardiance in July. Patient never started as he did not know why he needed it. If function still reduced will start Jardiance  - BMP, BNP, Mg  - needs to stop smoking    2. ICD in situ. Followed with Dr. Marie. Referring to Dr. Rodriguez to establish care in light of Dr. Marie leaving.    3. mild CAD ([facility] 2016). No angina. Continues on Atorvastatin 40mg every day    4. HTN. Well controlled on current regimen    5. DLD. Tolerating statin well    6. Tobacco Abuse. Educated on the risks of smoking & reviewed benefits of cessation  - Discussed need for complete " cessation    7. COPD. Last saw Dr. Morales in 2019.  - discuss re-establishing at next visit    Given the progression of his HF, he is at high risk for acute decompensation & hospitalization. Quite honestly, I am surprised he has remained out of hospital with an EF of 20%. Requires close monitoring with frequent follow up. Did discuss his case with Dr. Caldwell in regards to his Advanced Heart Failure. Agrees with plan for cMRI prior to Adv HF eval.    Follow up with me after cMRI  Tracy M Schwab, SUKHDEEP-CNP

## 2023-11-13 DIAGNOSIS — E11.9 TYPE 2 DIABETES MELLITUS WITHOUT COMPLICATION, WITHOUT LONG-TERM CURRENT USE OF INSULIN (MULTI): ICD-10-CM

## 2023-11-13 RX ORDER — METFORMIN HYDROCHLORIDE 500 MG/1
500 TABLET ORAL
Qty: 30 TABLET | Refills: 3 | Status: SHIPPED | OUTPATIENT
Start: 2023-11-13 | End: 2024-04-05

## 2023-11-13 RX ORDER — PIOGLITAZONEHYDROCHLORIDE 15 MG/1
15 TABLET ORAL DAILY
Qty: 30 TABLET | Refills: 3 | Status: SHIPPED | OUTPATIENT
Start: 2023-11-13 | End: 2024-03-29

## 2023-11-13 RX ORDER — GLIMEPIRIDE 1 MG/1
1 TABLET ORAL
Qty: 30 TABLET | Refills: 3 | Status: SHIPPED | OUTPATIENT
Start: 2023-11-13 | End: 2024-03-17 | Stop reason: ALTCHOICE

## 2023-12-19 DIAGNOSIS — E87.6 POTASSIUM DEFICIENCY: ICD-10-CM

## 2023-12-19 DIAGNOSIS — I10 ESSENTIAL HYPERTENSION: ICD-10-CM

## 2023-12-19 RX ORDER — POTASSIUM CHLORIDE 20 MEQ/1
20 TABLET, EXTENDED RELEASE ORAL 2 TIMES DAILY
Qty: 180 TABLET | Refills: 1 | Status: SHIPPED | OUTPATIENT
Start: 2023-12-19 | End: 2024-06-16

## 2023-12-19 RX ORDER — CARVEDILOL 25 MG/1
25 TABLET ORAL
Qty: 180 TABLET | Refills: 1 | Status: SHIPPED | OUTPATIENT
Start: 2023-12-19

## 2023-12-19 NOTE — TELEPHONE ENCOUNTER
Rx Refill Request Telephone Encounter    Name:  Armaan De Jesus  :  [id number]  Medication Name:  Coreg  Dose : 25 mg  Route : oral  Frequency : 2 times daily with meals  Quantity : 180 tablet  Directions : Take 1 tablet by mouth 2 times a day with meals  Specific Pharmacy location:  [facility]--[place], [place]  Date of last appointment:  2023  Date of next appointment:  2024  Best number to reach patient:  [phone]    Rx Refill Request Telephone Encounter    Name:  Armaan De Jesus  :  [id number]  Medication Name:  Klor-Con M20  Dose : 20 meq  Route : oral  Frequency : 2 times daily  Quantity : 180 tablet  Directions : Take 1 tablet by mouth 2 times a day      Patient stated he though he had stop medication.  Requesting medication.                
No

## 2023-12-26 ENCOUNTER — ANCILLARY PROCEDURE (OUTPATIENT)
Dept: RADIOLOGY | Facility: CLINIC | Age: 58
End: 2023-12-26
Payer: COMMERCIAL

## 2023-12-26 DIAGNOSIS — I42.9 CARDIOMYOPATHY, UNSPECIFIED TYPE (MULTI): ICD-10-CM

## 2023-12-27 DIAGNOSIS — I50.22 CHRONIC SYSTOLIC CONGESTIVE HEART FAILURE (MULTI): ICD-10-CM

## 2023-12-27 DIAGNOSIS — I42.0 DILATED CARDIOMYOPATHY (MULTI): Primary | ICD-10-CM

## 2023-12-27 NOTE — PROGRESS NOTES
Could not lay flat for cMRI. Will get repeat echo & if EF still reduced then will refer to Advance Heart Failure clinic. Virtual follow up after. Staff messaged to schedule.

## 2024-01-02 ENCOUNTER — APPOINTMENT (OUTPATIENT)
Dept: CARDIOLOGY | Facility: CLINIC | Age: 59
End: 2024-01-02
Payer: COMMERCIAL

## 2024-02-08 ENCOUNTER — APPOINTMENT (OUTPATIENT)
Dept: CARDIOLOGY | Facility: CLINIC | Age: 59
End: 2024-02-08
Payer: COMMERCIAL

## 2024-02-15 PROBLEM — E11.9 TYPE 2 DIABETES MELLITUS (MULTI): Status: ACTIVE | Noted: 2023-09-12

## 2024-02-16 ENCOUNTER — HOSPITAL ENCOUNTER (OUTPATIENT)
Dept: CARDIOLOGY | Facility: HOSPITAL | Age: 59
Discharge: HOME | End: 2024-02-16
Payer: COMMERCIAL

## 2024-02-16 DIAGNOSIS — I50.22 CHRONIC SYSTOLIC CONGESTIVE HEART FAILURE (MULTI): ICD-10-CM

## 2024-02-16 DIAGNOSIS — I42.0 DILATED CARDIOMYOPATHY (MULTI): ICD-10-CM

## 2024-02-16 LAB
AORTIC VALVE MEAN GRADIENT: 5.2 MMHG
AORTIC VALVE PEAK VELOCITY: 1.65 M/S
AV PEAK GRADIENT: 10.9 MMHG
LEFT ATRIUM VOLUME AREA LENGTH INDEX BSA: 24.4 ML/M2
LEFT VENTRICLE INTERNAL DIMENSION DIASTOLE: 6.34 CM (ref 3.5–6)
RIGHT VENTRICLE FREE WALL PEAK S': 16 CM/S
RIGHT VENTRICLE PEAK SYSTOLIC PRESSURE: 16.9 MMHG
TRICUSPID ANNULAR PLANE SYSTOLIC EXCURSION: 2.5 CM

## 2024-02-16 PROCEDURE — 93306 TTE W/DOPPLER COMPLETE: CPT | Performed by: INTERNAL MEDICINE

## 2024-02-16 PROCEDURE — 93306 TTE W/DOPPLER COMPLETE: CPT

## 2024-02-20 ENCOUNTER — OFFICE VISIT (OUTPATIENT)
Dept: CARDIOLOGY | Facility: CLINIC | Age: 59
End: 2024-02-20
Payer: COMMERCIAL

## 2024-02-20 VITALS
DIASTOLIC BLOOD PRESSURE: 90 MMHG | OXYGEN SATURATION: 91 % | HEART RATE: 74 BPM | BODY MASS INDEX: 29.94 KG/M2 | SYSTOLIC BLOOD PRESSURE: 162 MMHG | WEIGHT: 246 LBS

## 2024-02-20 DIAGNOSIS — I49.3 PVC (PREMATURE VENTRICULAR CONTRACTION): ICD-10-CM

## 2024-02-20 DIAGNOSIS — Z95.810 BIVENTRICULAR ICD (IMPLANTABLE CARDIOVERTER-DEFIBRILLATOR) IN PLACE: ICD-10-CM

## 2024-02-20 DIAGNOSIS — I50.22 CHRONIC SYSTOLIC CONGESTIVE HEART FAILURE (MULTI): Primary | ICD-10-CM

## 2024-02-20 DIAGNOSIS — I42.9 CARDIOMYOPATHY, UNSPECIFIED TYPE (MULTI): ICD-10-CM

## 2024-02-20 DIAGNOSIS — Z95.0 S/P PLACEMENT OF CARDIAC PACEMAKER: ICD-10-CM

## 2024-02-20 PROCEDURE — 3077F SYST BP >= 140 MM HG: CPT | Performed by: STUDENT IN AN ORGANIZED HEALTH CARE EDUCATION/TRAINING PROGRAM

## 2024-02-20 PROCEDURE — 99214 OFFICE O/P EST MOD 30 MIN: CPT | Performed by: STUDENT IN AN ORGANIZED HEALTH CARE EDUCATION/TRAINING PROGRAM

## 2024-02-20 PROCEDURE — 3080F DIAST BP >= 90 MM HG: CPT | Performed by: STUDENT IN AN ORGANIZED HEALTH CARE EDUCATION/TRAINING PROGRAM

## 2024-02-20 PROCEDURE — 93000 ELECTROCARDIOGRAM COMPLETE: CPT | Performed by: STUDENT IN AN ORGANIZED HEALTH CARE EDUCATION/TRAINING PROGRAM

## 2024-02-20 PROCEDURE — 4010F ACE/ARB THERAPY RXD/TAKEN: CPT | Performed by: STUDENT IN AN ORGANIZED HEALTH CARE EDUCATION/TRAINING PROGRAM

## 2024-02-20 RX ORDER — EMPAGLIFLOZIN 10 MG/1
10 TABLET, FILM COATED ORAL DAILY
COMMUNITY
Start: 2024-01-30 | End: 2024-02-23 | Stop reason: SDUPTHER

## 2024-02-20 NOTE — PROGRESS NOTES
Cardiac Electrophysiology Office Visit     Referred by Dr. Schwab, Tracy M, APRN-PAULA* for   Chief Complaint   Patient presents with    New Patient Visit     HPI:  Armaan De Jesus is a 58 y.o. year old male patient with h/o NICM (LVEF 20%) s/p ICD, CAD, HTN, HLD, T2DM, COPD, Tobacco use presenting today to establish care    Pt reports that he has been doing ok. He denies any CP, does have SOB with exertion which has been chronic and unchanged. No Episodes of ICD shock and no LOC.    Objective  Current Outpatient Medications   Medication Instructions    acetaminophen (Tylenol) 325 mg tablet acetaminophen 325 mg oral tablet ; 3 tab(s) orally every 8 hours as needed for pain Quantity: 60 Refills: 0 Ordered: 30-Apr-2022 Sukhwinder Stafford Start: 30-Apr-2022 Generic Substitution Allowed Comments: This product contains acetaminophen. Do not use with any other product containing acetaminophen to prevent possible liver damage.    aclidinium (Tudorza) 400 mcg/actuation inhaler Tudorza Pressair 400 MCG/ACT Inhalation Aerosol Powder Breath Activated INHALE 1 PUFFS Twice daily Quantity: 1 Refills: 3 Ordered: 10-Sep-2018 Carmen Tafoya MD, Adham Start : 31-Jan-2017 Active    albuterol 90 mcg/actuation inhaler 2 puffs, inhalation, Every 6 hours PRN    atorvastatin (LIPITOR) 40 mg, oral, Daily    blood sugar diagnostic (FreeStyle Test) strip Use as directed.    carvedilol (COREG) 25 mg, oral, 2 times daily with meals    cyclobenzaprine (FLEXERIL) 10 mg, oral, Nightly    digoxin (LANOXIN) 125 mcg, oral, Daily    gabapentin (NEURONTIN) 800 mg, oral, Daily RT    glimepiride (AMARYL) 1 mg, oral, Daily before breakfast    glycopyrrolate-formoteroL (Bevespi Aerosphere) 9-4.8 mcg HFA aerosol inhaler 2 puffs, inhalation, Daily    Jardiance 10 mg, oral, Daily    lisinopril 40 mg, oral, Daily    metFORMIN (GLUCOPHAGE) 500 mg, oral, Daily with breakfast    pioglitazone (ACTOS) 15 mg, oral, Daily    potassium chloride CR (Klor-Con M20) 20 mEq ER  tablet 20 mEq, oral, 2 times daily    spironolactone (ALDACTONE) 25 mg, oral, Daily    traMADol (Ultram) 50 mg tablet traMADol HCl - 50 MG Oral Tablet TAKE 1 TABLET TWICE DAILY AS NEEDED. Quantity: 20 Refills: 0 Ordered: 6-Jan-2022 Wing Castillo PA-C Start : 3-Dec-2021 Active         Visit Vitals  /90   Pulse 74   Wt 112 kg (246 lb)   SpO2 91%   BMI 29.94 kg/m²   Smoking Status Every Day   BSA 2.45 m²      Physical Exam  Constitutional:       Appearance: Normal appearance.   HENT:      Head: Normocephalic.   Cardiovascular:      Rate and Rhythm: Normal rate and regular rhythm.      Pulses: Normal pulses.      Heart sounds: No murmur heard.     Comments: left sided implant healed well, no ecchymosis, hematoma or drainage noted  Pulmonary:      Effort: Pulmonary effort is normal. No respiratory distress.   Musculoskeletal:         General: No swelling.   Skin:     General: Skin is warm and dry.   Neurological:      Mental Status: He is alert.   Psychiatric:         Mood and Affect: Mood normal.        My Interpretation of Reviewed Study(s):  Echo (Deb 2024): Severely decreased LV function with an EF of 30 to 35% global hypokinesis with minor regional wall motion abnormalities.  Normal biatrial size.  Normal biventricular size and function.  No significant valvular pathology.  No pericardial effusion noted.    Assessment/Plan   #NICM - NYHA Class III s/p CRT (2017, Abbott)  Patient has a [employer]/TwtBks[employer] Biventricular ICD.  Anticipated battery longevity 2.4yrs (as of Nov 2023).    RA pacing 38%, RV pacing 91%. BiV pacing 1%  Arrhythmias noted on interrogation: Some PMT, PVCs  Lead parameters stable with steady impedance and thresholds noted: Stable  -c/t to follow with device clinic as scheduled - at [place]   -Planned for CMR and Advanced HF evaluation - Was unable to complete CMR due to claustrophobia  -Follow with HF clinic    Return to Clinic: Patient should return to the EP Clinic in 6 months    Ziyad Rodriguez MD  [facility]  Cardiac Electrophysiology  [email or web address]    **Disclaimer: This note was dictated by speech recognition, and every effort has been made to prevent any error in transcription, however minor errors may be present**

## 2024-02-23 ENCOUNTER — LAB (OUTPATIENT)
Dept: LAB | Facility: LAB | Age: 59
End: 2024-02-23
Payer: COMMERCIAL

## 2024-02-23 ENCOUNTER — TELEMEDICINE (OUTPATIENT)
Dept: CARDIOLOGY | Facility: CLINIC | Age: 59
End: 2024-02-23
Payer: COMMERCIAL

## 2024-02-23 DIAGNOSIS — I42.0 DILATED CARDIOMYOPATHY (MULTI): ICD-10-CM

## 2024-02-23 DIAGNOSIS — I42.9 CARDIOMYOPATHY, UNSPECIFIED TYPE (MULTI): ICD-10-CM

## 2024-02-23 DIAGNOSIS — I10 PRIMARY HYPERTENSION: Chronic | ICD-10-CM

## 2024-02-23 DIAGNOSIS — I50.22 CHRONIC SYSTOLIC CONGESTIVE HEART FAILURE (MULTI): ICD-10-CM

## 2024-02-23 DIAGNOSIS — I42.9 CARDIOMYOPATHY, UNSPECIFIED TYPE (MULTI): Primary | ICD-10-CM

## 2024-02-23 DIAGNOSIS — E78.00 HYPERCHOLESTEROLEMIA: ICD-10-CM

## 2024-02-23 LAB
ANION GAP SERPL CALC-SCNC: 10 MMOL/L (ref 10–20)
BNP SERPL-MCNC: 95 PG/ML (ref 0–99)
BUN SERPL-MCNC: 16 MG/DL (ref 6–23)
CALCIUM SERPL-MCNC: 10.6 MG/DL (ref 8.6–10.3)
CHLORIDE SERPL-SCNC: 98 MMOL/L (ref 98–107)
CO2 SERPL-SCNC: 36 MMOL/L (ref 21–32)
CREAT SERPL-MCNC: 0.92 MG/DL (ref 0.5–1.3)
DIGOXIN SERPL-MCNC: 0.49 NG/ML (ref 0.8–?)
EGFRCR SERPLBLD CKD-EPI 2021: >90 ML/MIN/1.73M*2
GLUCOSE SERPL-MCNC: 231 MG/DL (ref 74–99)
MAGNESIUM SERPL-MCNC: 1.62 MG/DL (ref 1.6–2.4)
POTASSIUM SERPL-SCNC: 5 MMOL/L (ref 3.5–5.3)
SODIUM SERPL-SCNC: 139 MMOL/L (ref 136–145)

## 2024-02-23 PROCEDURE — 99214 OFFICE O/P EST MOD 30 MIN: CPT | Performed by: NURSE PRACTITIONER

## 2024-02-23 PROCEDURE — 80048 BASIC METABOLIC PNL TOTAL CA: CPT

## 2024-02-23 PROCEDURE — 80162 ASSAY OF DIGOXIN TOTAL: CPT

## 2024-02-23 PROCEDURE — 4010F ACE/ARB THERAPY RXD/TAKEN: CPT | Performed by: NURSE PRACTITIONER

## 2024-02-23 PROCEDURE — 83880 ASSAY OF NATRIURETIC PEPTIDE: CPT

## 2024-02-23 PROCEDURE — 36415 COLL VENOUS BLD VENIPUNCTURE: CPT

## 2024-02-23 PROCEDURE — 83735 ASSAY OF MAGNESIUM: CPT

## 2024-02-23 RX ORDER — EMPAGLIFLOZIN 10 MG/1
10 TABLET, FILM COATED ORAL DAILY
Qty: 90 TABLET | Refills: 3 | Status: SHIPPED | OUTPATIENT
Start: 2024-02-23 | End: 2025-02-22

## 2024-02-23 RX ORDER — DIGOXIN 125 MCG
125 TABLET ORAL DAILY
Qty: 90 TABLET | Refills: 1 | Status: SHIPPED | OUTPATIENT
Start: 2024-02-23

## 2024-02-23 NOTE — PROGRESS NOTES
Name : Armaan De Jesus   : 1965   MRN : [id number]   ENC Date : 2024    CC: Results Review & HF management     HPI:    Mr. Butler is a 57 y/o male with PMHx sig for NIDCM, Chronic Systolic Heart Failure EF 20% (echo 2017), s/p ICD, mild CAD ([facility] 2016), HTN, HLD, DM II, COPD & Active Smoker 1/2PPD who presents today to review results.    Interval Hx:  His LV EF is estimated to be 20% or less, on an echo late of . Early 2018, he was hospitalized with acute on chronic systolic congestive heart failure.     He has done very well since his last visit. Reports no major health issues and has had no hospitalizations. Denies any chest pain, pressure, SOB/STEPHENSON, PND, orthopnea, LE edema, palpitations, lightheadedness, dizziness, or syncope at rest or with exertions.    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 Reports activity is limited by his legs. Knee replacement last year & feels like he is losing leg muscles. Sleeps on 1 pillow.    CV Diagnostics:  Echo 24:   1. Left ventricular systolic function is moderately to severely decreased with a 30-35% estimated ejection fraction.   2. Poorly visualized anatomical structures due to suboptimal image quality.   3. Increased LV mass.   4. Spectral Doppler shows an impaired relaxation pattern of left ventricular diastolic filling.   5. There is moderate eccentric left ventricular hypertrophy.   6. The estimated RVSP is 17 mm.   7. Left ventricular cavity size is moderate to severely dilated.   8. There is global hypokinesis of the left ventricle with minor regional variations.    ROS: unless otherwise noted in the history of present illness, all other systems were reviewed and they are negative for complaints     Allergies:  Patient has no known allergies.    Current Outpatient Medications   Medication Instructions    acetaminophen (Tylenol) 325 mg tablet acetaminophen 325 mg oral tablet ; 3 tab(s) orally every 8 hours as needed for  pain Quantity: 60 Refills: 0 Ordered: 30-Apr-2022 Sukhwinder Stafford Start: 30-Apr-2022 Generic Substitution Allowed Comments: This product contains acetaminophen. Do not use with any other product containing acetaminophen to prevent possible liver damage.    aclidinium (Tudorza) 400 mcg/actuation inhaler Tudorza Pressair 400 MCG/ACT Inhalation Aerosol Powder Breath Activated INHALE 1 PUFFS Twice daily Quantity: 1 Refills: 3 Ordered: 10-Sep-2018 Carmen Tafoya MD, Fernando Start : 31-Jan-2017 Active    albuterol 90 mcg/actuation inhaler 2 puffs, inhalation, Every 6 hours PRN    atorvastatin (LIPITOR) 40 mg, oral, Daily    blood sugar diagnostic (FreeStyle Test) strip Use as directed.    carvedilol (COREG) 25 mg, oral, 2 times daily with meals    cyclobenzaprine (FLEXERIL) 10 mg, oral, Nightly    digoxin (LANOXIN) 125 mcg, oral, Daily    gabapentin (NEURONTIN) 800 mg, oral, Daily RT    glimepiride (AMARYL) 1 mg, oral, Daily before breakfast    glycopyrrolate-formoteroL (Bevespi Aerosphere) 9-4.8 mcg HFA aerosol inhaler 2 puffs, inhalation, Daily    Jardiance 10 mg, oral, Daily    lisinopril 40 mg, oral, Daily    metFORMIN (GLUCOPHAGE) 500 mg, oral, Daily with breakfast    pioglitazone (ACTOS) 15 mg, oral, Daily    potassium chloride CR (Klor-Con M20) 20 mEq ER tablet 20 mEq, oral, 2 times daily    spironolactone (ALDACTONE) 25 mg, oral, Daily    traMADol (Ultram) 50 mg tablet traMADol HCl - 50 MG Oral Tablet TAKE 1 TABLET TWICE DAILY AS NEEDED. Quantity: 20 Refills: 0 Ordered: 6-Jan-2022 Wing Castillo PA-C Start : 3-Dec-2021 Active        Last Labs:  CBC -  Lab Results   Component Value Date    WBC 8.1 09/12/2023    HGB 14.5 09/12/2023    HCT 46.8 09/12/2023    MCV 95 09/12/2023     09/12/2023       CMP -  Lab Results   Component Value Date    CALCIUM 10.8 (H) 09/12/2023    PROT 6.9 09/12/2023    ALBUMIN 4.3 09/12/2023    AST 16 09/12/2023    ALT 13 09/12/2023    ALKPHOS 49 09/12/2023    BILITOT 0.5 09/12/2023        LIPID PANEL -   Lab Results   Component Value Date    CHOL 142 09/12/2023    TRIG 199 (H) 09/12/2023    HDL 48.8 09/12/2023    CHHDL 2.9 09/12/2023    LDLF 53 09/12/2023    VLDL 40 09/12/2023    NHDL 91 05/04/2021       RENAL FUNCTION PANEL -   Lab Results   Component Value Date    GLUCOSE 108 (H) 09/12/2023     09/12/2023    K 4.8 09/12/2023    CL 99 09/12/2023    CO2 34 (H) 09/12/2023    ANIONGAP 11 09/12/2023    BUN 16 09/12/2023    CREATININE 0.98 09/12/2023    GFRMALE 88 09/12/2023    CALCIUM 10.8 (H) 09/12/2023    ALBUMIN 4.3 09/12/2023        Lab Results   Component Value Date    BNP 40 03/20/2020    HGBA1C 7.7 (A) 09/12/2023       Last Recorded Vitals:  There were no vitals filed for this visit.      Physical Exam:  A+Ox3, NAD    Assessment/Plan:  Nonischemic Dilated Cardiomyopathy. HFrEF (20%-->30-35%). Stage C. NYHA Class I-II.  - he was unable to complete cMRI 2/2 claustrophobia   - start Jardiance 10 mg every day   - c/w Coreg 25mg BID  - c/w Lisinopril 40mg every day  - c/w Spironolactone 25mg every day  - c/w Digoxin 0.125mg   - Reminded him to get BMP, BNP, Mg, Dig level done that I ordered in november  - needs to stop smoking  - on Actos but not taking it as he is out of test strips. Advised him not to take this any longer as it is contraindicated in HF.    2. ICD in situ. Established with Dr. Rodriguez    3. mild CAD ([facility] 2016). No angina. Continues on Atorvastatin 40mg every day.     4. HTN. Well controlled on current regimen last visit. Will re-eval next in person visit.    5. DLD. Tolerating statin well    6. Tobacco Abuse. Educated on the risks of smoking & reviewed benefits of cessation  - Discussed need for complete cessation    7. COPD. Last saw Dr. Morales in 2019.  - discuss re-establishing at next visit    During our conversation to review echo results he let me know that he stopped taking all of his anti-diabetic medications because he ran out of test strips. Reports he called PCP  office for refills, but no one called him back. Long discussion about being his own advocate & that this is an unacceptable response. He stated that he would call PCP office today. I personally sent a message to Dr. Elaine to update him on Mr De Jesus case & asked that Actos be permanently discontinued. We also discussed his need for smoking cessation because of the negative effects on his CV health as well as would be a barrier for him to receive advance therapies.    Once labs result will determine next steps & timeframe for follow up. Would like to get him on entresto & have him evaluated by our Advanced HF clinic. Barriers to advanced therapies are: issues with compliance & his continued smoking.    Tracy M Schwab, APRN-CNP

## 2024-02-27 ENCOUNTER — TELEPHONE (OUTPATIENT)
Dept: PRIMARY CARE | Facility: CLINIC | Age: 59
End: 2024-02-27
Payer: COMMERCIAL

## 2024-02-27 NOTE — TELEPHONE ENCOUNTER
----- Message from Malik Elaine MD sent at 2/24/2024 11:05 AM EST -----  Regarding: RE: [facility] Patient. Cardiology Update  I will forward this to my clinical staff to see about making sure we can get him the supplies he needs for test strips.  Please arrange  ----- Message -----  From: Melvicy M Schwab, SUKHDEEP-CNP  Sent: 2/23/2024   8:54 AM EST  To: Malik Elaine MD  Subject: [facility] Patient. Cardiology Update                Hi Dr. Elaine,    I met Armaan for the first time back in November to establish CV care. Noted that he hadn't had an echo since 2017 & previously reported EF was 20%. I sent him for a cMRI as etiology of his HF is not clear. He was unable to complete 2/2 claustrophobia; therefore, I sent him for an echo instead. EF still reduced at 30-35%. I will optimize his HF regimen.     Just got off the phone with Armaan to go over these results. In speaking with him, he has not been taking any of his anti-diabetic medications because he is out of test strips. Stated he called your office, but no one ever called him back. I had a long talk with him about being an adult & managing his care. He is supposed to give you a call today, even spoon fed him your number.      I am starting him on Jardiance for HFrEF as he never took it when Dr. Garcia originally prescribed it. In reviewing his diabetic regimen, I see that he is on Actos. I would like to make sure we stop that please as it is contraindicated in Heart Failure.     Ultimately I would like to have him evaluated by our Advanced HF team, but with his lack of compliance & continued smoking, it's a waste of an appointment. I will continue to work with him on these various aspects.    Thanks much for your time.  Melvi

## 2024-02-28 ENCOUNTER — APPOINTMENT (OUTPATIENT)
Dept: PRIMARY CARE | Facility: CLINIC | Age: 59
End: 2024-02-28
Payer: COMMERCIAL

## 2024-02-28 NOTE — PROGRESS NOTES
Subjective   Patient ID: Armaan De Jesus is a 59 y.o. male who presents for No chief complaint on file..  HPI  Patient presents today for a follow-up on Diabetes, Hypertension, and Hypercholesterolemia. *** follow a low sugar, low sodium, and low fat diet. *** check his sugar and/or BP at home. Exercises***. Had yearly blood work on 9-12-23.      Taking current medications which were reviewed.  Problem list discussed.    Overall doing well.  Eating okay.  Staying active.    Has no other new problem /question.    ROS  Constitutional- No activity change. No appetite change.  Eyes- Denies vision changes.  Respiratory- No shortness of breath.  Cardiovascular- No palpitations. No chest pain.  GI- No nausea or vomiting. No diarrhea or constipation. Denies abdominal pain.  Musculoskeletal- Denies joint swelling.  Extremities- No edema.  Neurological- Denies headaches. Denies dizziness.  Skin- No rashes.  Psychiatric/Behavioral- Denies significant anxiety, or depressed mood.     Objective     There were no vitals taken for this visit.    No Known Allergies    Constitutional-- Well-nourished.  No distress  Head- unremarkable.  Ears- TMs clear.  Eyes- PERRL.  Conjunctiva normal.  Nose- Normal.  No rhinorrhea noted.  Throat- Oropharynx is clear and moist.  Neck- Supple with no thyromegaly.  No significant cervical adenopathy noted.  Pulmonary/Chest- Breath sounds normal with normal effort.  No wheezing.  Heart- Regular rate and rhythm.  No murmur.  Abdomen- Soft and non-tender.  No masses noted.  Musculoskeletal- Normal ROM.  No significant joint swelling  Extremities- No edema.   Neurological- Alert.  No noted deficits.  Skin- Warm.  No rashes.  Psychiatric/Behavioral- Mood and affect normal.  Behavior normal.     Assessment/Plan   1. Type 2 diabetes mellitus without complication, without long-term current use of insulin (CMS/[facility])        2. Hypercholesterolemia        3. Chronic obstructive pulmonary disease, unspecified COPD  type (CMS/[facility])        4. Primary hypertension               Long talk. Treatment options reviewed.    Continue and take your medications as prescribed.    Health Maintenance issues discussed.    Importance of healthy diet and regular exercise regimen discussed.    We will contact you with any test results ordered. If you do not hear from us, please contact.    Follow-up as instructed or sooner if any problems or symptoms do not resolve as expected.

## 2024-03-11 NOTE — PROGRESS NOTES
"Subjective   Patient ID: Armaan De Jesus is a 59 y.o. male who presents for Diabetes, Hypertension, and Hypercholesterolemia.  HPI  Patient presents today for a follow-up on Diabetes, Hypertension, and Hypercholesterolemia. Tries to follow a low sugar, low sodium, and low fat diet. Occasionally checks his sugar at home. Exercises. Had yearly blood work on 9-12-23.     States Amaryl is \"too potent\". He has not been taking it. States it makes him fall out of his seat, and puts him in a \"coma-like\" state.      Taking current medications which were reviewed.  Problem list discussed.     Overall doing well.  Eating okay.  Staying active.     Has no other new problem /question.  ROS  Constitutional- No activity change. No appetite change.  Eyes- Denies vision changes.  Respiratory- No shortness of breath.  Cardiovascular- No palpitations. No chest pain.  GI- No nausea or vomiting. No diarrhea or constipation. Denies abdominal pain.  Musculoskeletal- Denies joint swelling.  Extremities- No edema.  Neurological- Denies headaches. Denies dizziness.  Skin- No rashes.  Psychiatric/Behavioral- Denies significant anxiety, or depressed mood.     Objective     /72   Pulse 79   Temp 36.8 °C (98.2 °F)   Resp 16   Ht 1.93 m (6' 4\")   Wt 112 kg (246 lb)   SpO2 95%   BMI 29.94 kg/m²     No Known Allergies    Constitutional-- Well-nourished.  No distress  Head- unremarkable.  Ears- TMs clear.  Eyes- PERRL.  Conjunctiva normal.  Nose- Normal.  No rhinorrhea noted.  Throat- Oropharynx is clear and moist.  Neck- Supple with no thyromegaly.  No significant cervical adenopathy noted.  Pulmonary/Chest- Breath sounds normal with normal effort.  No wheezing.  Heart- Regular rate and rhythm.  No murmur.  Abdomen- Soft and non-tender.  No masses noted.  Musculoskeletal- Normal ROM.  No significant joint swelling  Extremities- No edema.   Neurological- Alert.  No noted deficits.  Skin- Warm.  No rashes.  Psychiatric/Behavioral- Mood and " affect normal.  Behavior normal.     Assessment/Plan   1. Type 2 diabetes mellitus without complication, without long-term current use of insulin (CMS/HCC)  Albumin, urine, random    Hemoglobin A1c    Basic metabolic panel    blood sugar diagnostic (FreeStyle Test) strip      2. Primary hypertension  Basic metabolic panel      3. Hypercholesterolemia  Basic metabolic panel      4. Chronic obstructive pulmonary disease, unspecified COPD type (CMS/HCC)        5. Cardiomyopathy, unspecified type (CMS/HCC)        6. Special screening for malignant neoplasm of colon  Colonoscopy Screening; Average Risk Patient      7. History of colon polyps  Colonoscopy Screening; Average Risk Patient      8. BMI 29.0-29.9,adult        9. Chronic pain of left knee  gabapentin (Neurontin) 800 mg tablet    cyclobenzaprine (Flexeril) 10 mg tablet      10. Poisoning by unspecified narcotics, accidental (unintentional), initial encounter (CMS/[facility])        11. Type 2 diabetes mellitus with ketoacidosis without coma, with long-term current use of insulin (CMS/HCC)               Long talk. Treatment options reviewed.    Reviewed most recent lab work with patient. Advised patient to remain up to date on routine maintenance and health screening.  Maintain appointments with specialists as scheduled.     Hypertension controlled.      Educated on COPD.    Diabetes fair control  Please complete lab work as discussed.     Continue and take your medications as prescribed.    Health Maintenance issues discussed.    Importance of healthy diet and regular exercise regimen discussed.    We will contact you with any test results ordered. If you do not hear from us, please contact.    Follow-up as instructed or sooner if any problems or symptoms do not resolve as expected.      Scribe Attestation  By signing my name below, Libby STEWART Scribe   attest that this documentation has been prepared under the direction and in the presence of Malik Elaine  MD.

## 2024-03-12 ENCOUNTER — OFFICE VISIT (OUTPATIENT)
Dept: PRIMARY CARE | Facility: CLINIC | Age: 59
End: 2024-03-12
Payer: COMMERCIAL

## 2024-03-12 VITALS
OXYGEN SATURATION: 95 % | WEIGHT: 246 LBS | TEMPERATURE: 98.2 F | SYSTOLIC BLOOD PRESSURE: 116 MMHG | HEIGHT: 76 IN | BODY MASS INDEX: 29.96 KG/M2 | RESPIRATION RATE: 16 BRPM | HEART RATE: 79 BPM | DIASTOLIC BLOOD PRESSURE: 72 MMHG

## 2024-03-12 DIAGNOSIS — Z79.4 TYPE 2 DIABETES MELLITUS WITH KETOACIDOSIS WITHOUT COMA, WITH LONG-TERM CURRENT USE OF INSULIN (MULTI): ICD-10-CM

## 2024-03-12 DIAGNOSIS — I42.9 CARDIOMYOPATHY, UNSPECIFIED TYPE (MULTI): ICD-10-CM

## 2024-03-12 DIAGNOSIS — E11.10 TYPE 2 DIABETES MELLITUS WITH KETOACIDOSIS WITHOUT COMA, WITH LONG-TERM CURRENT USE OF INSULIN (MULTI): ICD-10-CM

## 2024-03-12 DIAGNOSIS — Z86.010 HISTORY OF COLON POLYPS: ICD-10-CM

## 2024-03-12 DIAGNOSIS — Z12.11 SPECIAL SCREENING FOR MALIGNANT NEOPLASM OF COLON: ICD-10-CM

## 2024-03-12 DIAGNOSIS — M25.562 CHRONIC PAIN OF LEFT KNEE: ICD-10-CM

## 2024-03-12 DIAGNOSIS — G89.29 CHRONIC PAIN OF LEFT KNEE: ICD-10-CM

## 2024-03-12 DIAGNOSIS — E11.9 TYPE 2 DIABETES MELLITUS WITHOUT COMPLICATION, WITHOUT LONG-TERM CURRENT USE OF INSULIN (MULTI): Primary | ICD-10-CM

## 2024-03-12 DIAGNOSIS — E78.00 HYPERCHOLESTEROLEMIA: ICD-10-CM

## 2024-03-12 DIAGNOSIS — T40.601A: ICD-10-CM

## 2024-03-12 DIAGNOSIS — J44.9 CHRONIC OBSTRUCTIVE PULMONARY DISEASE, UNSPECIFIED COPD TYPE (MULTI): ICD-10-CM

## 2024-03-12 DIAGNOSIS — I10 PRIMARY HYPERTENSION: Chronic | ICD-10-CM

## 2024-03-12 PROCEDURE — 3074F SYST BP LT 130 MM HG: CPT | Performed by: FAMILY MEDICINE

## 2024-03-12 PROCEDURE — 3008F BODY MASS INDEX DOCD: CPT | Performed by: FAMILY MEDICINE

## 2024-03-12 PROCEDURE — 3078F DIAST BP <80 MM HG: CPT | Performed by: FAMILY MEDICINE

## 2024-03-12 PROCEDURE — 4010F ACE/ARB THERAPY RXD/TAKEN: CPT | Performed by: FAMILY MEDICINE

## 2024-03-12 PROCEDURE — 99213 OFFICE O/P EST LOW 20 MIN: CPT | Performed by: FAMILY MEDICINE

## 2024-03-12 RX ORDER — CYCLOBENZAPRINE HCL 10 MG
10 TABLET ORAL NIGHTLY
Qty: 90 TABLET | Refills: 1 | Status: SHIPPED | OUTPATIENT
Start: 2024-03-12

## 2024-03-12 RX ORDER — BLOOD SUGAR DIAGNOSTIC
STRIP MISCELLANEOUS
Qty: 100 STRIP | Refills: 1 | Status: SHIPPED | OUTPATIENT
Start: 2024-03-12

## 2024-03-12 RX ORDER — GABAPENTIN 800 MG/1
800 TABLET ORAL
Qty: 90 TABLET | Refills: 1 | Status: SHIPPED | OUTPATIENT
Start: 2024-03-12

## 2024-03-12 ASSESSMENT — PATIENT HEALTH QUESTIONNAIRE - PHQ9
SUM OF ALL RESPONSES TO PHQ9 QUESTIONS 1 AND 2: 0
2. FEELING DOWN, DEPRESSED OR HOPELESS: NOT AT ALL
1. LITTLE INTEREST OR PLEASURE IN DOING THINGS: NOT AT ALL

## 2024-03-13 ENCOUNTER — HOSPITAL ENCOUNTER (OUTPATIENT)
Dept: CARDIOLOGY | Facility: CLINIC | Age: 59
Discharge: HOME | End: 2024-03-13
Payer: COMMERCIAL

## 2024-03-13 DIAGNOSIS — Z95.0 S/P PLACEMENT OF CARDIAC PACEMAKER: ICD-10-CM

## 2024-03-13 DIAGNOSIS — I50.22 CHRONIC SYSTOLIC CONGESTIVE HEART FAILURE (MULTI): ICD-10-CM

## 2024-03-13 DIAGNOSIS — Z95.810 BIVENTRICULAR ICD (IMPLANTABLE CARDIOVERTER-DEFIBRILLATOR) IN PLACE: ICD-10-CM

## 2024-03-13 PROCEDURE — 93284 PRGRMG EVAL IMPLANTABLE DFB: CPT | Performed by: STUDENT IN AN ORGANIZED HEALTH CARE EDUCATION/TRAINING PROGRAM

## 2024-03-13 PROCEDURE — 93284 PRGRMG EVAL IMPLANTABLE DFB: CPT

## 2024-03-17 PROBLEM — Z79.4 TYPE 2 DIABETES MELLITUS WITH KETOACIDOSIS WITHOUT COMA, WITH LONG-TERM CURRENT USE OF INSULIN (MULTI): Status: ACTIVE | Noted: 2023-09-12

## 2024-03-17 PROBLEM — E11.10 TYPE 2 DIABETES MELLITUS WITH KETOACIDOSIS WITHOUT COMA, WITH LONG-TERM CURRENT USE OF INSULIN (MULTI): Status: ACTIVE | Noted: 2023-09-12

## 2024-03-18 DIAGNOSIS — I47.29 PAROXYSMAL VENTRICULAR TACHYCARDIA (MULTI): ICD-10-CM

## 2024-03-18 DIAGNOSIS — I42.0 DILATED CARDIOMYOPATHY (MULTI): ICD-10-CM

## 2024-03-18 DIAGNOSIS — Z95.810 BIVENTRICULAR ICD (IMPLANTABLE CARDIOVERTER-DEFIBRILLATOR) IN PLACE: ICD-10-CM

## 2024-03-19 ENCOUNTER — HOSPITAL ENCOUNTER (OUTPATIENT)
Dept: CARDIOLOGY | Facility: CLINIC | Age: 59
Discharge: HOME | End: 2024-03-19
Payer: COMMERCIAL

## 2024-03-19 DIAGNOSIS — I42.0 DILATED CARDIOMYOPATHY (MULTI): ICD-10-CM

## 2024-03-19 DIAGNOSIS — Z95.810 BIVENTRICULAR ICD (IMPLANTABLE CARDIOVERTER-DEFIBRILLATOR) IN PLACE: ICD-10-CM

## 2024-03-19 DIAGNOSIS — I47.29 PAROXYSMAL VENTRICULAR TACHYCARDIA (MULTI): ICD-10-CM

## 2024-03-21 ENCOUNTER — TELEPHONE (OUTPATIENT)
Dept: PRIMARY CARE | Facility: CLINIC | Age: 59
End: 2024-03-21
Payer: COMMERCIAL

## 2024-03-21 DIAGNOSIS — E11.9 TYPE 2 DIABETES MELLITUS WITHOUT COMPLICATION, WITHOUT LONG-TERM CURRENT USE OF INSULIN (MULTI): ICD-10-CM

## 2024-03-21 NOTE — TELEPHONE ENCOUNTER
Did a prior authorization for freestyle test strips, received a fax back from patients insurance - they have denied the request for the following reason:    Preferred diabetic supply products are:  One Touch Ultra Blue, OneTouch Verio and Truemetrix.    Spoke with patient - he is aware of denial - just need to send over to his pharmacy for one of the above - needs new meter, lancets and test strips to pharmacy on record.    Thank you!

## 2024-03-21 NOTE — ADDENDUM NOTE
Encounter addended by: Shruthi Pimentel RN on: 3/21/2024 5:42 PM   Actions taken: Imaging Exam ended

## 2024-03-22 RX ORDER — BLOOD SUGAR DIAGNOSTIC
STRIP MISCELLANEOUS
Qty: 100 EACH | Refills: 1 | Status: SHIPPED | OUTPATIENT
Start: 2024-03-22

## 2024-03-22 RX ORDER — DEXTROSE 4 G
TABLET,CHEWABLE ORAL
Qty: 1 EACH | Refills: 0 | Status: SHIPPED | OUTPATIENT
Start: 2024-03-22 | End: 2024-04-29

## 2024-03-22 RX ORDER — LANCETS
EACH MISCELLANEOUS
Qty: 100 EACH | Refills: 1 | Status: SHIPPED | OUTPATIENT
Start: 2024-03-22

## 2024-03-27 ENCOUNTER — HOSPITAL ENCOUNTER (OUTPATIENT)
Dept: CARDIOLOGY | Facility: CLINIC | Age: 59
Discharge: HOME | End: 2024-03-27
Payer: COMMERCIAL

## 2024-03-27 DIAGNOSIS — I47.29 PAROXYSMAL VENTRICULAR TACHYCARDIA (MULTI): ICD-10-CM

## 2024-03-27 DIAGNOSIS — I42.0 DILATED CARDIOMYOPATHY (MULTI): ICD-10-CM

## 2024-03-27 DIAGNOSIS — Z95.810 BIVENTRICULAR ICD (IMPLANTABLE CARDIOVERTER-DEFIBRILLATOR) IN PLACE: ICD-10-CM

## 2024-03-27 PROCEDURE — 93284 PRGRMG EVAL IMPLANTABLE DFB: CPT

## 2024-03-27 PROCEDURE — 93284 PRGRMG EVAL IMPLANTABLE DFB: CPT | Performed by: STUDENT IN AN ORGANIZED HEALTH CARE EDUCATION/TRAINING PROGRAM

## 2024-03-29 DIAGNOSIS — E11.9 TYPE 2 DIABETES MELLITUS WITHOUT COMPLICATION, WITHOUT LONG-TERM CURRENT USE OF INSULIN (MULTI): ICD-10-CM

## 2024-03-29 RX ORDER — PIOGLITAZONEHYDROCHLORIDE 15 MG/1
15 TABLET ORAL DAILY
Qty: 30 TABLET | Refills: 3 | Status: SHIPPED | OUTPATIENT
Start: 2024-03-29

## 2024-03-31 DIAGNOSIS — J44.9 CHRONIC OBSTRUCTIVE PULMONARY DISEASE, UNSPECIFIED COPD TYPE (MULTI): ICD-10-CM

## 2024-04-01 RX ORDER — ALBUTEROL SULFATE 90 UG/1
2 AEROSOL, METERED RESPIRATORY (INHALATION) EVERY 6 HOURS PRN
Qty: 18 G | Refills: 1 | Status: SHIPPED | OUTPATIENT
Start: 2024-04-01 | End: 2024-05-31

## 2024-04-05 DIAGNOSIS — E11.9 TYPE 2 DIABETES MELLITUS WITHOUT COMPLICATION, WITHOUT LONG-TERM CURRENT USE OF INSULIN (MULTI): ICD-10-CM

## 2024-04-05 RX ORDER — METFORMIN HYDROCHLORIDE 500 MG/1
500 TABLET ORAL
Qty: 30 TABLET | Refills: 5 | Status: SHIPPED | OUTPATIENT
Start: 2024-04-05

## 2024-04-15 ENCOUNTER — HOSPITAL ENCOUNTER (OUTPATIENT)
Dept: CARDIOLOGY | Facility: CLINIC | Age: 59
Discharge: HOME | End: 2024-04-15
Payer: COMMERCIAL

## 2024-04-15 DIAGNOSIS — I42.0 DILATED CARDIOMYOPATHY (MULTI): ICD-10-CM

## 2024-04-15 DIAGNOSIS — I47.29 PAROXYSMAL VENTRICULAR TACHYCARDIA (MULTI): ICD-10-CM

## 2024-04-15 DIAGNOSIS — Z95.810 BIVENTRICULAR ICD (IMPLANTABLE CARDIOVERTER-DEFIBRILLATOR) IN PLACE: ICD-10-CM

## 2024-04-28 DIAGNOSIS — E11.9 TYPE 2 DIABETES MELLITUS WITHOUT COMPLICATION, WITHOUT LONG-TERM CURRENT USE OF INSULIN (MULTI): ICD-10-CM

## 2024-04-29 RX ORDER — DEXTROSE 4 G
TABLET,CHEWABLE ORAL
Qty: 1 EACH | Refills: 0 | Status: SHIPPED | OUTPATIENT
Start: 2024-04-29

## 2024-05-04 DIAGNOSIS — E78.00 HYPERCHOLESTEROLEMIA: ICD-10-CM

## 2024-05-06 RX ORDER — ATORVASTATIN CALCIUM 40 MG/1
40 TABLET, FILM COATED ORAL DAILY
Qty: 90 TABLET | Refills: 1 | Status: SHIPPED | OUTPATIENT
Start: 2024-05-06

## 2024-05-16 ENCOUNTER — HOSPITAL ENCOUNTER (OUTPATIENT)
Dept: CARDIOLOGY | Facility: CLINIC | Age: 59
Discharge: HOME | End: 2024-05-16
Payer: COMMERCIAL

## 2024-05-16 DIAGNOSIS — Z95.810 BIVENTRICULAR ICD (IMPLANTABLE CARDIOVERTER-DEFIBRILLATOR) IN PLACE: ICD-10-CM

## 2024-05-16 DIAGNOSIS — I47.29 PAROXYSMAL VENTRICULAR TACHYCARDIA (MULTI): ICD-10-CM

## 2024-05-16 DIAGNOSIS — I42.0 DILATED CARDIOMYOPATHY (MULTI): ICD-10-CM

## 2024-05-31 DIAGNOSIS — J44.9 CHRONIC OBSTRUCTIVE PULMONARY DISEASE, UNSPECIFIED COPD TYPE (MULTI): ICD-10-CM

## 2024-05-31 RX ORDER — ALBUTEROL SULFATE 90 UG/1
2 AEROSOL, METERED RESPIRATORY (INHALATION) EVERY 6 HOURS PRN
Qty: 8 G | Refills: 2 | Status: SHIPPED | OUTPATIENT
Start: 2024-05-31

## 2024-06-17 ENCOUNTER — HOSPITAL ENCOUNTER (OUTPATIENT)
Dept: CARDIOLOGY | Facility: CLINIC | Age: 59
Discharge: HOME | End: 2024-06-17
Payer: COMMERCIAL

## 2024-06-17 DIAGNOSIS — I47.29 PAROXYSMAL VENTRICULAR TACHYCARDIA (MULTI): ICD-10-CM

## 2024-06-17 DIAGNOSIS — I42.0 DILATED CARDIOMYOPATHY (MULTI): ICD-10-CM

## 2024-06-17 DIAGNOSIS — Z95.810 BIVENTRICULAR ICD (IMPLANTABLE CARDIOVERTER-DEFIBRILLATOR) IN PLACE: ICD-10-CM

## 2024-07-11 LAB — HEMOGLOBIN A1C/HEMOGLOBIN TOTAL IN BLOOD EXTERNAL: 8.2 %

## 2024-07-18 ENCOUNTER — APPOINTMENT (OUTPATIENT)
Dept: CARDIOLOGY | Facility: CLINIC | Age: 59
End: 2024-07-18
Payer: COMMERCIAL

## 2024-07-24 ENCOUNTER — HOSPITAL ENCOUNTER (OUTPATIENT)
Dept: CARDIOLOGY | Facility: CLINIC | Age: 59
Discharge: HOME | End: 2024-07-24
Payer: COMMERCIAL

## 2024-07-24 DIAGNOSIS — Z95.810 BIVENTRICULAR ICD (IMPLANTABLE CARDIOVERTER-DEFIBRILLATOR) IN PLACE: ICD-10-CM

## 2024-07-24 DIAGNOSIS — I47.29 PAROXYSMAL VENTRICULAR TACHYCARDIA (MULTI): ICD-10-CM

## 2024-07-24 DIAGNOSIS — I42.0 DILATED CARDIOMYOPATHY (MULTI): ICD-10-CM

## 2024-07-24 PROCEDURE — 93296 REM INTERROG EVL PM/IDS: CPT

## 2024-07-24 PROCEDURE — 93294 REM INTERROG EVL PM/LDLS PM: CPT | Performed by: STUDENT IN AN ORGANIZED HEALTH CARE EDUCATION/TRAINING PROGRAM

## 2024-07-25 ENCOUNTER — APPOINTMENT (OUTPATIENT)
Dept: CARDIOLOGY | Facility: CLINIC | Age: 59
End: 2024-07-25
Payer: COMMERCIAL

## 2024-07-30 DIAGNOSIS — E11.9 TYPE 2 DIABETES MELLITUS WITHOUT COMPLICATION, WITHOUT LONG-TERM CURRENT USE OF INSULIN (MULTI): ICD-10-CM

## 2024-07-30 PROBLEM — Z86.010 HISTORY OF COLONIC POLYPS: Status: ACTIVE | Noted: 2024-07-30

## 2024-07-30 PROBLEM — I42.9 CARDIOMYOPATHY (MULTI): Status: RESOLVED | Noted: 2023-07-20 | Resolved: 2024-07-30

## 2024-07-30 PROBLEM — Z86.0100 HISTORY OF COLONIC POLYPS: Status: ACTIVE | Noted: 2024-07-30

## 2024-07-30 RX ORDER — PIOGLITAZONEHYDROCHLORIDE 15 MG/1
15 TABLET ORAL DAILY
Qty: 30 TABLET | Refills: 1 | Status: SHIPPED | OUTPATIENT
Start: 2024-07-30

## 2024-08-19 ENCOUNTER — HOSPITAL ENCOUNTER (OUTPATIENT)
Dept: CARDIOLOGY | Facility: CLINIC | Age: 59
Discharge: HOME | End: 2024-08-19
Payer: COMMERCIAL

## 2024-08-19 DIAGNOSIS — Z95.810 BIVENTRICULAR ICD (IMPLANTABLE CARDIOVERTER-DEFIBRILLATOR) IN PLACE: ICD-10-CM

## 2024-08-19 DIAGNOSIS — I47.29 PAROXYSMAL VENTRICULAR TACHYCARDIA (MULTI): ICD-10-CM

## 2024-08-19 DIAGNOSIS — I42.0 DILATED CARDIOMYOPATHY (MULTI): ICD-10-CM

## 2024-08-20 ENCOUNTER — APPOINTMENT (OUTPATIENT)
Dept: CARDIOLOGY | Facility: CLINIC | Age: 59
End: 2024-08-20
Payer: COMMERCIAL

## 2024-08-20 VITALS
OXYGEN SATURATION: 93 % | BODY MASS INDEX: 28.97 KG/M2 | HEART RATE: 62 BPM | SYSTOLIC BLOOD PRESSURE: 128 MMHG | WEIGHT: 238 LBS | RESPIRATION RATE: 16 BRPM | DIASTOLIC BLOOD PRESSURE: 76 MMHG

## 2024-08-20 DIAGNOSIS — I48.91 ATRIAL FIBRILLATION, UNSPECIFIED TYPE (MULTI): ICD-10-CM

## 2024-08-20 DIAGNOSIS — Z95.810 BIVENTRICULAR ICD (IMPLANTABLE CARDIOVERTER-DEFIBRILLATOR) IN PLACE: Primary | ICD-10-CM

## 2024-08-20 DIAGNOSIS — I10 HTN (HYPERTENSION): Chronic | ICD-10-CM

## 2024-08-20 DIAGNOSIS — I49.3 PVC (PREMATURE VENTRICULAR CONTRACTION): ICD-10-CM

## 2024-08-20 DIAGNOSIS — I50.22 CHRONIC SYSTOLIC CONGESTIVE HEART FAILURE (MULTI): ICD-10-CM

## 2024-08-20 DIAGNOSIS — R06.09 DYSPNEA ON EXERTION: ICD-10-CM

## 2024-08-20 PROCEDURE — 93005 ELECTROCARDIOGRAM TRACING: CPT | Performed by: STUDENT IN AN ORGANIZED HEALTH CARE EDUCATION/TRAINING PROGRAM

## 2024-08-20 PROCEDURE — 99214 OFFICE O/P EST MOD 30 MIN: CPT | Performed by: STUDENT IN AN ORGANIZED HEALTH CARE EDUCATION/TRAINING PROGRAM

## 2024-08-20 PROCEDURE — 93010 ELECTROCARDIOGRAM REPORT: CPT | Performed by: STUDENT IN AN ORGANIZED HEALTH CARE EDUCATION/TRAINING PROGRAM

## 2024-08-20 PROCEDURE — 4010F ACE/ARB THERAPY RXD/TAKEN: CPT | Performed by: STUDENT IN AN ORGANIZED HEALTH CARE EDUCATION/TRAINING PROGRAM

## 2024-08-20 PROCEDURE — 3078F DIAST BP <80 MM HG: CPT | Performed by: STUDENT IN AN ORGANIZED HEALTH CARE EDUCATION/TRAINING PROGRAM

## 2024-08-20 PROCEDURE — 3074F SYST BP LT 130 MM HG: CPT | Performed by: STUDENT IN AN ORGANIZED HEALTH CARE EDUCATION/TRAINING PROGRAM

## 2024-08-20 PROCEDURE — 3052F HG A1C>EQUAL 8.0%<EQUAL 9.0%: CPT | Performed by: STUDENT IN AN ORGANIZED HEALTH CARE EDUCATION/TRAINING PROGRAM

## 2024-08-20 NOTE — PROGRESS NOTES
Cardiac Electrophysiology Office Visit     Referred by Dr. Arriaza ref. provider found for   No chief complaint on file.    HPI:  Armaan De Jesus is a 59 y.o. year old male patient with h/o NICM (LVEF 20%) s/p ICD, CAD, HTN, HLD, T2DM, COPD, Tobacco use presenting today for follow up    Pt reports that he has been doing ok. He denies any CP, does have SOB with exertion which has been chronic and unchanged. No Episodes of ICD shock and no LOC.    Objective  Current Outpatient Medications   Medication Instructions    albuterol 90 mcg/actuation inhaler 2 puffs, inhalation, Every 6 hours PRN    atorvastatin (LIPITOR) 40 mg, oral, Daily    blood sugar diagnostic (FreeStyle Test) strip Use as directed.    blood-glucose meter (OneTouch Ultra2 Meter) misc TEST BID    carvedilol (COREG) 25 mg, oral, 2 times daily (morning and late afternoon)    cyclobenzaprine (FLEXERIL) 10 mg, oral, Nightly    digoxin (LANOXIN) 125 mcg, oral, Daily    gabapentin (NEURONTIN) 800 mg, oral, Daily RT    Jardiance 10 mg, oral, Daily    lancets misc Use as directed    lisinopril 40 mg, oral, Daily    metFORMIN (GLUCOPHAGE) 500 mg, oral, Daily with breakfast    OneTouch Ultra Test strip Use as directed    pioglitazone (ACTOS) 15 mg, oral, Daily    spironolactone (ALDACTONE) 25 mg, oral, Daily         Visit Vitals  Smoking Status Every Day      Physical Exam  Constitutional:       Appearance: Normal appearance.   HENT:      Head: Normocephalic.   Cardiovascular:      Rate and Rhythm: Normal rate and regular rhythm.      Pulses: Normal pulses.      Heart sounds: No murmur heard.     Comments: left sided implant healed well, no ecchymosis, hematoma or drainage noted  Pulmonary:      Effort: Pulmonary effort is normal. No respiratory distress.      Breath sounds: Examination of the right-lower field reveals wheezing. Examination of the left-lower field reveals wheezing. Wheezing present.   Musculoskeletal:         General: No swelling.   Skin:      General: Skin is warm and dry.   Neurological:      Mental Status: He is alert.   Psychiatric:         Mood and Affect: Mood normal.      My Interpretation of Reviewed Study(s):  Echo (Feb 2024): Severely decreased LV function with an EF of 30 to 35% global hypokinesis with minor regional wall motion abnormalities.  Normal biatrial size.  Normal biventricular size and function.  No significant valvular pathology.  No pericardial effusion noted.    Assessment/Plan   #NICM - NYHA Class III s/p CRT (2017, Abbott)  Patient has a [employer] Biventricular ICD.  Anticipated batte ry longevity 5.7mos (as of 8/19/24).    RA pacing 46%, RV pacing 96%. BiV pacing 96%  Arrhythmias noted on interrogation: PVC burden 2.7%  Lead parameters stable with steady impedance and thresholds noted: Stable  c/t to follow with device clinic as scheduled - at [place]   Planned for CMR and Advanced HF evaluation - Was unable to complete CMR due to claustrophobia  Follow with HF clinic - GDMT (Coreg, digoxin, lisinopril, Aldactone)  We briefly discussed possible replacement when patient's device reaches JUSTIN he is agreeable we will contact the patient when device is at replacement.    Return to Clinic: Patient should return to the EP Clinic in 1 year    Ziyad Rodriguez MD [facility]  Cardiac Electrophysiology  [email or web address]    **Disclaimer: This note was dictated by speech recognition, and every effort has been made to prevent any error in transcription, however minor errors may be present**

## 2024-08-27 ENCOUNTER — TELEPHONE (OUTPATIENT)
Dept: CARDIOLOGY | Facility: CLINIC | Age: 59
End: 2024-08-27
Payer: COMMERCIAL

## 2024-08-27 DIAGNOSIS — E78.00 HYPERCHOLESTEROLEMIA: ICD-10-CM

## 2024-08-27 RX ORDER — DIGOXIN 125 MCG
125 TABLET ORAL DAILY
Qty: 90 TABLET | Refills: 3 | Status: SHIPPED | OUTPATIENT
Start: 2024-08-27 | End: 2025-08-27

## 2024-08-27 NOTE — TELEPHONE ENCOUNTER
Call to Armaan to get him scheduled to see Tracy Schwab CNP for 6 month CV Eval. Message left to contact the office to schedule this appointment.

## 2024-09-13 ENCOUNTER — APPOINTMENT (OUTPATIENT)
Dept: PRIMARY CARE | Facility: CLINIC | Age: 59
End: 2024-09-13
Payer: COMMERCIAL

## 2024-09-13 VITALS
BODY MASS INDEX: 28.59 KG/M2 | SYSTOLIC BLOOD PRESSURE: 102 MMHG | TEMPERATURE: 97.6 F | RESPIRATION RATE: 16 BRPM | WEIGHT: 234.8 LBS | OXYGEN SATURATION: 97 % | DIASTOLIC BLOOD PRESSURE: 62 MMHG | HEART RATE: 67 BPM | HEIGHT: 76 IN

## 2024-09-13 DIAGNOSIS — E11.10 TYPE 2 DIABETES MELLITUS WITH KETOACIDOSIS WITHOUT COMA, WITH LONG-TERM CURRENT USE OF INSULIN (MULTI): ICD-10-CM

## 2024-09-13 DIAGNOSIS — G89.29 CHRONIC PAIN OF LEFT KNEE: ICD-10-CM

## 2024-09-13 DIAGNOSIS — E11.9 TYPE 2 DIABETES MELLITUS WITHOUT COMPLICATION, WITHOUT LONG-TERM CURRENT USE OF INSULIN (MULTI): ICD-10-CM

## 2024-09-13 DIAGNOSIS — Z79.4 TYPE 2 DIABETES MELLITUS WITH KETOACIDOSIS WITHOUT COMA, WITH LONG-TERM CURRENT USE OF INSULIN (MULTI): ICD-10-CM

## 2024-09-13 DIAGNOSIS — I42.9 CARDIOMYOPATHY, UNSPECIFIED TYPE (MULTI): ICD-10-CM

## 2024-09-13 DIAGNOSIS — G47.30 SLEEP APNEA, UNSPECIFIED TYPE: ICD-10-CM

## 2024-09-13 DIAGNOSIS — Z12.5 SCREENING PSA (PROSTATE SPECIFIC ANTIGEN): ICD-10-CM

## 2024-09-13 DIAGNOSIS — M25.562 CHRONIC PAIN OF LEFT KNEE: ICD-10-CM

## 2024-09-13 DIAGNOSIS — I10 ESSENTIAL HYPERTENSION: ICD-10-CM

## 2024-09-13 DIAGNOSIS — E78.00 HYPERCHOLESTEROLEMIA: ICD-10-CM

## 2024-09-13 DIAGNOSIS — I10 PRIMARY HYPERTENSION: Primary | Chronic | ICD-10-CM

## 2024-09-13 PROCEDURE — 3074F SYST BP LT 130 MM HG: CPT | Performed by: FAMILY MEDICINE

## 2024-09-13 PROCEDURE — 99214 OFFICE O/P EST MOD 30 MIN: CPT | Performed by: FAMILY MEDICINE

## 2024-09-13 PROCEDURE — 3008F BODY MASS INDEX DOCD: CPT | Performed by: FAMILY MEDICINE

## 2024-09-13 PROCEDURE — 4010F ACE/ARB THERAPY RXD/TAKEN: CPT | Performed by: FAMILY MEDICINE

## 2024-09-13 PROCEDURE — 3078F DIAST BP <80 MM HG: CPT | Performed by: FAMILY MEDICINE

## 2024-09-13 PROCEDURE — 3052F HG A1C>EQUAL 8.0%<EQUAL 9.0%: CPT | Performed by: FAMILY MEDICINE

## 2024-09-13 RX ORDER — LANCETS
EACH MISCELLANEOUS
Qty: 100 EACH | Refills: 1 | Status: SHIPPED | OUTPATIENT
Start: 2024-09-13

## 2024-09-13 RX ORDER — METFORMIN HYDROCHLORIDE 500 MG/1
500 TABLET ORAL
Qty: 30 TABLET | Refills: 5 | Status: SHIPPED | OUTPATIENT
Start: 2024-09-13

## 2024-09-13 RX ORDER — BLOOD SUGAR DIAGNOSTIC
STRIP MISCELLANEOUS
Qty: 100 EACH | Refills: 1 | Status: SHIPPED | OUTPATIENT
Start: 2024-09-13

## 2024-09-13 RX ORDER — PIOGLITAZONEHYDROCHLORIDE 15 MG/1
15 TABLET ORAL DAILY
Qty: 30 TABLET | Refills: 1 | Status: SHIPPED | OUTPATIENT
Start: 2024-09-13

## 2024-09-13 RX ORDER — EMPAGLIFLOZIN 10 MG/1
10 TABLET, FILM COATED ORAL DAILY
Qty: 30 TABLET | Refills: 5 | Status: SHIPPED | OUTPATIENT
Start: 2024-09-13 | End: 2025-03-12

## 2024-09-13 RX ORDER — CYCLOBENZAPRINE HCL 10 MG
10 TABLET ORAL NIGHTLY
Qty: 90 TABLET | Refills: 1 | Status: SHIPPED | OUTPATIENT
Start: 2024-09-13

## 2024-09-13 RX ORDER — GABAPENTIN 800 MG/1
800 TABLET ORAL
Qty: 90 TABLET | Refills: 1 | Status: SHIPPED | OUTPATIENT
Start: 2024-09-13

## 2024-09-13 RX ORDER — CARVEDILOL 25 MG/1
25 TABLET ORAL
Qty: 180 TABLET | Refills: 1 | Status: SHIPPED | OUTPATIENT
Start: 2024-09-13

## 2024-09-13 RX ORDER — POTASSIUM CHLORIDE 20 MEQ/1
20 TABLET, EXTENDED RELEASE ORAL 2 TIMES DAILY
Qty: 60 TABLET | Refills: 5 | Status: SHIPPED | OUTPATIENT
Start: 2024-09-13 | End: 2025-03-12

## 2024-09-13 ASSESSMENT — ANXIETY QUESTIONNAIRES
5. BEING SO RESTLESS THAT IT IS HARD TO SIT STILL: NOT AT ALL
3. WORRYING TOO MUCH ABOUT DIFFERENT THINGS: NOT AT ALL
7. FEELING AFRAID AS IF SOMETHING AWFUL MIGHT HAPPEN: NOT AT ALL
GAD7 TOTAL SCORE: 0
IF YOU CHECKED OFF ANY PROBLEMS ON THIS QUESTIONNAIRE, HOW DIFFICULT HAVE THESE PROBLEMS MADE IT FOR YOU TO DO YOUR WORK, TAKE CARE OF THINGS AT HOME, OR GET ALONG WITH OTHER PEOPLE: NOT DIFFICULT AT ALL
6. BECOMING EASILY ANNOYED OR IRRITABLE: NOT AT ALL
4. TROUBLE RELAXING: NOT AT ALL
2. NOT BEING ABLE TO STOP OR CONTROL WORRYING: NOT AT ALL
1. FEELING NERVOUS, ANXIOUS, OR ON EDGE: NOT AT ALL

## 2024-09-13 ASSESSMENT — ENCOUNTER SYMPTOMS
DEPRESSION: 0
LOSS OF SENSATION IN FEET: 0
OCCASIONAL FEELINGS OF UNSTEADINESS: 0

## 2024-09-13 ASSESSMENT — PATIENT HEALTH QUESTIONNAIRE - PHQ9
2. FEELING DOWN, DEPRESSED OR HOPELESS: NOT AT ALL
SUM OF ALL RESPONSES TO PHQ9 QUESTIONS 1 & 2: 0
1. LITTLE INTEREST OR PLEASURE IN DOING THINGS: NOT AT ALL

## 2024-09-13 NOTE — PROGRESS NOTES
"Subjective   Patient ID: Armaan De Jesus is a 59 y.o. male who presents for Hypertension, Hyperlipidemia, and Diabetes.  HPI  Patient presents in office today for HTN, HLD, and DM follow up. Tries to follow a low sugar, low sodium, low fat diet. Stays active. Does check sugars/BP at home. Denies any side effects from medications.      Taking current medications which were reviewed.  Problem list discussed.    Overall doing well.  Eating okay.  Staying active.    Has no other new problem /question.     ROS  Constitutional- No activity change. No appetite change.  Eyes- Denies vision changes.  Respiratory- No shortness of breath.  Cardiovascular- No palpitations. No chest pain.  GI- No nausea or vomiting. No diarrhea or constipation. Denies abdominal pain.  Musculoskeletal- Denies joint swelling.  Extremities- No edema.  Neurological- Denies headaches. Denies dizziness.  Skin- No rashes.  Psychiatric/Behavioral- Denies significant anxiety, or depressed mood.     Objective     /62   Pulse 67   Temp 36.4 °C (97.6 °F)   Resp 16   Ht 1.93 m (6' 4\")   Wt 107 kg (234 lb 12.8 oz)   SpO2 97%   BMI 28.58 kg/m²     No Known Allergies    Constitutional-- Well-nourished.  No distress  Head- unremarkable.  Ears- TMs clear.  Eyes- PERRL.  Conjunctiva normal.  Nose- Normal.  No rhinorrhea noted.  Throat- Oropharynx is clear and moist.  Neck- Supple with no thyromegaly.  No significant cervical adenopathy noted.  Pulmonary/Chest- Breath sounds normal with normal effort.  No wheezing.  Heart- Regular rate and rhythm.  No murmur.  Abdomen- Soft and non-tender.  No masses noted.  Musculoskeletal- Normal ROM.  No significant joint swelling  Extremities- No edema.   Neurological- Alert.  No noted deficits.  Skin- Warm.  No rashes.  Psychiatric/Behavioral- Mood and affect normal.  Behavior normal.     Assessment/Plan   1. Primary hypertension  CBC and Auto Differential    Comprehensive metabolic panel      2. Hypercholesterolemia "  Lipid panel      3. Type 2 diabetes mellitus with ketoacidosis without coma, with long-term current use of insulin (Multi)  Comprehensive metabolic panel    Hemoglobin A1C      4. Essential hypertension  carvedilol (Coreg) 25 mg tablet    potassium chloride CR (Klor-Con M20) 20 mEq ER tablet      5. Chronic pain of left knee  cyclobenzaprine (Flexeril) 10 mg tablet    gabapentin (Neurontin) 800 mg tablet      6. Type 2 diabetes mellitus without complication, without long-term current use of insulin (Multi)  CBC and Auto Differential    Comprehensive metabolic panel    lancets misc    OneTouch Ultra Test strip    metFORMIN (Glucophage) 500 mg tablet    pioglitazone (Actos) 15 mg tablet      7. Cardiomyopathy, unspecified type (Multi)  Jardiance 10 mg    potassium chloride CR (Klor-Con M20) 20 mEq ER tablet      8. Screening PSA (prostate specific antigen)  Prostate Specific Antigen, Screen             Long talk. Treatment options reviewed.    Reviewed most recent lab work with patient. Advised patient to remain up to date on routine maintenance and health screening.  Maintain appointments with specialists as scheduled.     Continue and take your medications as prescribed.    Health Maintenance issues discussed.  Hypertension controlled.  Diabetes stable.  Sleep apnea controlled  Cardiomyopathy stable.  Continues to see cardiology.    Importance of healthy diet and regular exercise regimen discussed.    We will contact you with any test results ordered. If you do not hear from us, please contact.    Follow-up as instructed or sooner if any problems or symptoms do not resolve as expected.

## 2024-09-19 ENCOUNTER — HOSPITAL ENCOUNTER (OUTPATIENT)
Dept: CARDIOLOGY | Facility: CLINIC | Age: 59
Discharge: HOME | End: 2024-09-19
Payer: COMMERCIAL

## 2024-09-19 DIAGNOSIS — Z95.810 BIVENTRICULAR ICD (IMPLANTABLE CARDIOVERTER-DEFIBRILLATOR) IN PLACE: ICD-10-CM

## 2024-09-19 DIAGNOSIS — I47.29 PAROXYSMAL VENTRICULAR TACHYCARDIA (MULTI): ICD-10-CM

## 2024-09-19 DIAGNOSIS — I42.0 DILATED CARDIOMYOPATHY (MULTI): ICD-10-CM

## 2024-10-21 ENCOUNTER — HOSPITAL ENCOUNTER (OUTPATIENT)
Dept: CARDIOLOGY | Facility: CLINIC | Age: 59
Discharge: HOME | End: 2024-10-21
Payer: COMMERCIAL

## 2024-10-21 DIAGNOSIS — Z95.810 BIVENTRICULAR ICD (IMPLANTABLE CARDIOVERTER-DEFIBRILLATOR) IN PLACE: ICD-10-CM

## 2024-10-21 DIAGNOSIS — I42.0 DILATED CARDIOMYOPATHY (MULTI): ICD-10-CM

## 2024-10-21 DIAGNOSIS — I47.29 PAROXYSMAL VENTRICULAR TACHYCARDIA (MULTI): ICD-10-CM

## 2024-11-19 DIAGNOSIS — J44.9 CHRONIC OBSTRUCTIVE PULMONARY DISEASE, UNSPECIFIED COPD TYPE (MULTI): Primary | ICD-10-CM

## 2024-11-20 ENCOUNTER — HOSPITAL ENCOUNTER (OUTPATIENT)
Dept: CARDIOLOGY | Facility: CLINIC | Age: 59
Discharge: HOME | End: 2024-11-20
Payer: COMMERCIAL

## 2024-11-20 DIAGNOSIS — Z95.810 BIVENTRICULAR ICD (IMPLANTABLE CARDIOVERTER-DEFIBRILLATOR) IN PLACE: ICD-10-CM

## 2024-11-20 DIAGNOSIS — I47.29 PAROXYSMAL VENTRICULAR TACHYCARDIA (MULTI): ICD-10-CM

## 2024-11-20 DIAGNOSIS — I42.0 DILATED CARDIOMYOPATHY (MULTI): ICD-10-CM

## 2024-11-20 PROCEDURE — 93296 REM INTERROG EVL PM/IDS: CPT

## 2024-11-21 ENCOUNTER — APPOINTMENT (OUTPATIENT)
Dept: CARDIOLOGY | Facility: CLINIC | Age: 59
End: 2024-11-21
Payer: COMMERCIAL

## 2024-11-26 ENCOUNTER — APPOINTMENT (OUTPATIENT)
Dept: PHARMACY | Facility: HOSPITAL | Age: 59
End: 2024-11-26
Payer: COMMERCIAL

## 2024-11-26 DIAGNOSIS — J44.9 CHRONIC OBSTRUCTIVE PULMONARY DISEASE, UNSPECIFIED COPD TYPE (MULTI): ICD-10-CM

## 2024-11-26 RX ORDER — UMECLIDINIUM BROMIDE AND VILANTEROL TRIFENATATE 62.5; 25 UG/1; UG/1
1 POWDER RESPIRATORY (INHALATION) DAILY
Qty: 60 EACH | Refills: 11 | Status: SHIPPED | OUTPATIENT
Start: 2024-11-26

## 2024-11-26 NOTE — ASSESSMENT & PLAN NOTE
ASSESSMENT:  Patient with COPD that is stable and poorly controlled. Based on CAT score, exacerbation history, and eosinophil count, patient is GOLD Group B and LAMA/LABA therapy is recommended.   Patient currently only has a rescue inhaler which reports using a couple times per day. Patient reports not having a maintenance inhaler because he does not understand why he would need 2 inhalers. Discussed difference in rescue and maintenance and why a maintenance inhaler is important in COPD. He is agreeable to starting.    PLAN:  Medication Changes:  START:  Anoro Ellipta 1 puff daily    CONTINUE:  Albuterol HFA PRN    Monitoring and Education:  Anoro Ellipta Education:  Counseled patient on Anoro MOA, expectations, side effects, duration of therapy, administration, and monitoring parameters.  Open cover of inhaler, exhale away from the mouthpiece, place lips around the mouthpiece, inhale slowly and deeply, exhale again away from the inhaler  If using Breo, Arnuity or Trelegy, must rinse mouth with water after use  Side effects include: nose/throat irritation, oral candidiasis, anxiety, high heart rate, dry mouth and/or bitter taste  All questions and concerns addressed.

## 2024-11-26 NOTE — PROGRESS NOTES
Patient ID: Armaan De Jesus is a 59 y.o. male who presents for No chief complaint on file..  Pt is here for First appointment.     PCP/Referring Provider: Malik Elaine MD  Last Visit: 9.13.24  Next visit: 3.14.25    Verbal consent to manage patient's drug therapy was obtained from patient. They were informed they may decline to participate or withdraw from participation in pharmacy services at any time.      Subjective   Treatment Adherence:   Patient did take medications today.   Number of missed doses in last 7 days: 0.       Preferred pharmacy: [facility]  Can patient afford prescribed medications: Yes, no issues reported    HPI  PULMONARY ASSESSMENT  Patient has been diagnosed with: COPD  does not see a pulmonologist  Last visit: 9/2020 at [facility]  has not had PFT's completed in last 2 years - last in 2020    Current Regimen:  Albuterol PRN    Historical Treatment:  Bevespi - unknown reason    Symptom Management:  Current symptoms: dyspnea  Triggers: exertion  Alleviating factors: takes albuterol, rest    Exacerbation Hx:  When was your last hospitalization for an exacerbation? None found  When was the last time you were treated with antibiotics and/or steroids? 2022     Rescue Inhaler Use:  How often do you use your rescue inhaler? Couple times per day    Appropriate Inhaler Technique:      Vaccines:  Influenza: Date [2018]  PPSV23: Date [2024]  COVID: Date [2021]      Review of Systems    Objective     BP Readings from Last 4 Encounters:   09/13/24 102/62   08/20/24 128/76   03/12/24 116/72   02/20/24 162/90      There were no vitals filed for this visit.     Labs  Creatinine   Date Value Ref Range Status   02/23/2024 0.92 0.50 - 1.30 mg/dL Final     eGFR   Date Value Ref Range Status   02/23/2024 >90 >60 mL/min/1.73m*2 Final     Comment:     Calculations of estimated GFR are performed using the 2021 CKD-EPI Study Refit equation without the race variable for the IDMS-Traceable creatinine  methods.  https://jasn.asnjournals.org/content/early/2021/09/22/ASN.9483990146     Sodium   Date Value Ref Range Status   02/23/2024 139 136 - 145 mmol/L Final     Potassium   Date Value Ref Range Status   02/23/2024 5.0 3.5 - 5.3 mmol/L Final     Chloride   Date Value Ref Range Status   02/23/2024 98 98 - 107 mmol/L Final     Urea Nitrogen   Date Value Ref Range Status   02/23/2024 16 6 - 23 mg/dL Final     AST   Date Value Ref Range Status   09/12/2023 16 9 - 39 U/L Final        Lab Results   Component Value Date    BILITOT 0.5 09/12/2023    CALCIUM 10.6 (H) 02/23/2024    CO2 36 (H) 02/23/2024    CL 98 02/23/2024    CREATININE 0.92 02/23/2024    GLUCOSE 231 (H) 02/23/2024    ALKPHOS 49 09/12/2023    K 5.0 02/23/2024    PROT 6.9 09/12/2023     02/23/2024    AST 16 09/12/2023    ALT 13 09/12/2023    BUN 16 02/23/2024    ANIONGAP 10 02/23/2024    MG 1.62 02/23/2024    ALBUMIN 4.3 09/12/2023    LIPASE 104 (H) 03/20/2020    GFRMALE 88 09/12/2023     Lab Results   Component Value Date    TRIG 199 (H) 09/12/2023    CHOL 142 09/12/2023    HDL 48.8 09/12/2023     Lab Results   Component Value Date    HGBA1C 8.2 07/11/2024       Current Outpatient Medications   Medication Instructions    albuterol 90 mcg/actuation inhaler 2 puffs, inhalation, Every 6 hours PRN    atorvastatin (LIPITOR) 40 mg, oral, Daily    blood sugar diagnostic (FreeStyle Test) strip Use as directed.    blood-glucose meter (OneTouch Ultra2 Meter) misc TEST BID    carvedilol (COREG) 25 mg, oral, 2 times daily (morning and late afternoon)    cyclobenzaprine (FLEXERIL) 10 mg, oral, Nightly    digoxin (LANOXIN) 125 mcg, oral, Daily    gabapentin (NEURONTIN) 800 mg, oral, Daily RT    Jardiance 10 mg, oral, Daily    lancets misc Use as directed    lisinopril 40 mg, oral, Daily    metFORMIN (GLUCOPHAGE) 500 mg, oral, Daily with breakfast    OneTouch Ultra Test strip Use as directed    pioglitazone (ACTOS) 15 mg, oral, Daily    potassium chloride CR  (Klor-Con M20) 20 mEq ER tablet 20 mEq, oral, 2 times daily, Do not crush or chew.    spironolactone (ALDACTONE) 25 mg, oral, Daily        DRUG INTERACTIONS:  Digoxin and spironolactone: Concurrent spironolactone may result in elevated levels of and toxicity from digoxin. Symptoms of digoxin toxicity can include anorexia, nausea, vomiting, headache, fatigue, malaise, drowsiness, generalized muscle weakness, disorientation, hallucinations, visual disturbances, and arrhythmias. Ensure patient Is aware of monitoring parameters     Assessment/Plan   Problem List Items Addressed This Visit             ICD-10-CM    COPD (chronic obstructive pulmonary disease) (Multi) J44.9     ASSESSMENT:  Patient with COPD that is stable and poorly controlled. Based on CAT score, exacerbation history, and eosinophil count, patient is GOLD Group B and LAMA/LABA therapy is recommended.   Patient currently only has a rescue inhaler which reports using a couple times per day. Patient reports not having a maintenance inhaler because he does not understand why he would need 2 inhalers. Discussed difference in rescue and maintenance and why a maintenance inhaler is important in COPD. He is agreeable to starting.    PLAN:  Medication Changes:  START:  Anoro Ellipta 1 puff daily    CONTINUE:  Albuterol HFA PRN    Monitoring and Education:  Anoro Ellipta Education:  Counseled patient on Anoro MOA, expectations, side effects, duration of therapy, administration, and monitoring parameters.  Open cover of inhaler, exhale away from the mouthpiece, place lips around the mouthpiece, inhale slowly and deeply, exhale again away from the inhaler  If using Breo, Arnuity or Trelegy, must rinse mouth with water after use  Side effects include: nose/throat irritation, oral candidiasis, anxiety, high heart rate, dry mouth and/or bitter taste  All questions and concerns addressed.         Relevant Orders    Referral to Clinical Pharmacy       Immunizations  needed: DTaP, Hep A, Hep B, Varicella, Influenza, Prevnar, and COVID booster    Labs ordered:  none     Referrals:  none     Follow-up: 4 weeks     Patient was provided with PharmD phone number and encouraged to reach out with any questions or concerns Prior to next appointment or ask provider for another pharmacy referral.    Time spent with pt: Total length of time 20 (minutes) of the encounter and more than 50% was spent counseling the patient.    Thank you for allowing to take part in the care of this patient.    Bruna Dykes PharmD, TERRENCE  Clinical Pharmacist  [phone]  [email or web address]    Continue all meds under the continuation of care with the referring provider and clinical pharmacy team.    Verbal consent to manage patient's drug therapy was obtained from the patient. They were informed they may decline to participate or withdraw from participation in pharmacy services at any time.

## 2024-12-09 ENCOUNTER — TELEPHONE (OUTPATIENT)
Dept: CARDIOLOGY | Facility: CLINIC | Age: 59
End: 2024-12-09
Payer: COMMERCIAL

## 2024-12-09 DIAGNOSIS — I10 ESSENTIAL HYPERTENSION: ICD-10-CM

## 2024-12-09 DIAGNOSIS — E11.9 TYPE 2 DIABETES MELLITUS WITHOUT COMPLICATION, WITHOUT LONG-TERM CURRENT USE OF INSULIN (MULTI): ICD-10-CM

## 2024-12-09 RX ORDER — LISINOPRIL 40 MG/1
40 TABLET ORAL DAILY
Qty: 90 TABLET | Refills: 1 | Status: SHIPPED | OUTPATIENT
Start: 2024-12-09

## 2024-12-09 RX ORDER — PIOGLITAZONEHYDROCHLORIDE 15 MG/1
15 TABLET ORAL DAILY
Qty: 30 TABLET | Refills: 1 | Status: SHIPPED | OUTPATIENT
Start: 2024-12-09

## 2024-12-10 RX ORDER — SPIRONOLACTONE 25 MG/1
25 TABLET ORAL DAILY
Qty: 30 TABLET | Refills: 11 | Status: SHIPPED | OUTPATIENT
Start: 2024-12-10 | End: 2025-12-10

## 2024-12-15 ENCOUNTER — APPOINTMENT (OUTPATIENT)
Dept: CARDIOLOGY | Facility: HOSPITAL | Age: 59
End: 2024-12-15
Payer: COMMERCIAL

## 2024-12-15 ENCOUNTER — HOSPITAL ENCOUNTER (EMERGENCY)
Facility: HOSPITAL | Age: 59
Discharge: LEFT BEFORE DISCHARGE | End: 2024-12-15
Attending: STUDENT IN AN ORGANIZED HEALTH CARE EDUCATION/TRAINING PROGRAM
Payer: COMMERCIAL

## 2024-12-15 ENCOUNTER — APPOINTMENT (OUTPATIENT)
Dept: RADIOLOGY | Facility: HOSPITAL | Age: 59
End: 2024-12-15
Payer: COMMERCIAL

## 2024-12-15 VITALS
OXYGEN SATURATION: 92 % | BODY MASS INDEX: 29.22 KG/M2 | WEIGHT: 240 LBS | TEMPERATURE: 96.8 F | RESPIRATION RATE: 18 BRPM | DIASTOLIC BLOOD PRESSURE: 69 MMHG | HEIGHT: 76 IN | HEART RATE: 60 BPM | SYSTOLIC BLOOD PRESSURE: 122 MMHG

## 2024-12-15 DIAGNOSIS — I21.4 NSTEMI (NON-ST ELEVATED MYOCARDIAL INFARCTION) (MULTI): Primary | ICD-10-CM

## 2024-12-15 LAB
ALBUMIN SERPL BCP-MCNC: 4.1 G/DL (ref 3.4–5)
ALP SERPL-CCNC: 59 U/L (ref 33–136)
ALT SERPL W P-5'-P-CCNC: 11 U/L (ref 10–52)
ANION GAP SERPL CALC-SCNC: 11 MMOL/L (ref 10–20)
AST SERPL W P-5'-P-CCNC: 11 U/L (ref 9–39)
BASOPHILS # BLD AUTO: 0.04 X10*3/UL (ref 0–0.1)
BASOPHILS NFR BLD AUTO: 0.5 %
BILIRUB SERPL-MCNC: 0.5 MG/DL (ref 0–1.2)
BNP SERPL-MCNC: 106 PG/ML (ref 0–99)
BUN SERPL-MCNC: 15 MG/DL (ref 6–23)
CALCIUM SERPL-MCNC: 10.1 MG/DL (ref 8.6–10.3)
CARDIAC TROPONIN I PNL SERPL HS: 28 NG/L (ref 0–20)
CHLORIDE SERPL-SCNC: 101 MMOL/L (ref 98–107)
CO2 SERPL-SCNC: 32 MMOL/L (ref 21–32)
CREAT SERPL-MCNC: 0.84 MG/DL (ref 0.5–1.3)
D DIMER PPP FEU-MCNC: 419 NG/ML FEU
EGFRCR SERPLBLD CKD-EPI 2021: >90 ML/MIN/1.73M*2
EOSINOPHIL # BLD AUTO: 0.22 X10*3/UL (ref 0–0.7)
EOSINOPHIL NFR BLD AUTO: 3 %
ERYTHROCYTE [DISTWIDTH] IN BLOOD BY AUTOMATED COUNT: 12.9 % (ref 11.5–14.5)
GLUCOSE SERPL-MCNC: 159 MG/DL (ref 74–99)
HCT VFR BLD AUTO: 47.9 % (ref 41–52)
HGB BLD-MCNC: 14.8 G/DL (ref 13.5–17.5)
IMM GRANULOCYTES # BLD AUTO: 0.02 X10*3/UL (ref 0–0.7)
IMM GRANULOCYTES NFR BLD AUTO: 0.3 % (ref 0–0.9)
LYMPHOCYTES # BLD AUTO: 2.23 X10*3/UL (ref 1.2–4.8)
LYMPHOCYTES NFR BLD AUTO: 30.1 %
MCH RBC QN AUTO: 29.7 PG (ref 26–34)
MCHC RBC AUTO-ENTMCNC: 30.9 G/DL (ref 32–36)
MCV RBC AUTO: 96 FL (ref 80–100)
MONOCYTES # BLD AUTO: 0.65 X10*3/UL (ref 0.1–1)
MONOCYTES NFR BLD AUTO: 8.8 %
NEUTROPHILS # BLD AUTO: 4.24 X10*3/UL (ref 1.2–7.7)
NEUTROPHILS NFR BLD AUTO: 57.3 %
NRBC BLD-RTO: 0 /100 WBCS (ref 0–0)
PLATELET # BLD AUTO: 176 X10*3/UL (ref 150–450)
POTASSIUM SERPL-SCNC: 4.5 MMOL/L (ref 3.5–5.3)
PROT SERPL-MCNC: 6.6 G/DL (ref 6.4–8.2)
RBC # BLD AUTO: 4.99 X10*6/UL (ref 4.5–5.9)
SODIUM SERPL-SCNC: 139 MMOL/L (ref 136–145)
WBC # BLD AUTO: 7.4 X10*3/UL (ref 4.4–11.3)

## 2024-12-15 PROCEDURE — 84075 ASSAY ALKALINE PHOSPHATASE: CPT | Performed by: STUDENT IN AN ORGANIZED HEALTH CARE EDUCATION/TRAINING PROGRAM

## 2024-12-15 PROCEDURE — 71045 X-RAY EXAM CHEST 1 VIEW: CPT | Performed by: STUDENT IN AN ORGANIZED HEALTH CARE EDUCATION/TRAINING PROGRAM

## 2024-12-15 PROCEDURE — 71045 X-RAY EXAM CHEST 1 VIEW: CPT

## 2024-12-15 PROCEDURE — 2500000001 HC RX 250 WO HCPCS SELF ADMINISTERED DRUGS (ALT 637 FOR MEDICARE OP): Performed by: STUDENT IN AN ORGANIZED HEALTH CARE EDUCATION/TRAINING PROGRAM

## 2024-12-15 PROCEDURE — 83880 ASSAY OF NATRIURETIC PEPTIDE: CPT | Performed by: STUDENT IN AN ORGANIZED HEALTH CARE EDUCATION/TRAINING PROGRAM

## 2024-12-15 PROCEDURE — 36415 COLL VENOUS BLD VENIPUNCTURE: CPT | Performed by: STUDENT IN AN ORGANIZED HEALTH CARE EDUCATION/TRAINING PROGRAM

## 2024-12-15 PROCEDURE — 85379 FIBRIN DEGRADATION QUANT: CPT | Performed by: STUDENT IN AN ORGANIZED HEALTH CARE EDUCATION/TRAINING PROGRAM

## 2024-12-15 PROCEDURE — 99285 EMERGENCY DEPT VISIT HI MDM: CPT | Performed by: STUDENT IN AN ORGANIZED HEALTH CARE EDUCATION/TRAINING PROGRAM

## 2024-12-15 PROCEDURE — 93005 ELECTROCARDIOGRAM TRACING: CPT

## 2024-12-15 PROCEDURE — 85025 COMPLETE CBC W/AUTO DIFF WBC: CPT | Performed by: STUDENT IN AN ORGANIZED HEALTH CARE EDUCATION/TRAINING PROGRAM

## 2024-12-15 PROCEDURE — 84484 ASSAY OF TROPONIN QUANT: CPT | Performed by: STUDENT IN AN ORGANIZED HEALTH CARE EDUCATION/TRAINING PROGRAM

## 2024-12-15 RX ORDER — NAPROXEN SODIUM 220 MG/1
324 TABLET, FILM COATED ORAL ONCE
Status: DISCONTINUED | OUTPATIENT
Start: 2024-12-15 | End: 2024-12-15 | Stop reason: HOSPADM

## 2024-12-15 RX ORDER — ENOXAPARIN SODIUM 150 MG/ML
1 INJECTION SUBCUTANEOUS ONCE
Status: DISCONTINUED | OUTPATIENT
Start: 2024-12-15 | End: 2024-12-15

## 2024-12-15 RX ORDER — NITROGLYCERIN 0.4 MG/1
0.4 TABLET SUBLINGUAL EVERY 5 MIN PRN
Status: DISCONTINUED | OUTPATIENT
Start: 2024-12-15 | End: 2024-12-15 | Stop reason: HOSPADM

## 2024-12-15 ASSESSMENT — PAIN - FUNCTIONAL ASSESSMENT: PAIN_FUNCTIONAL_ASSESSMENT: 0-10

## 2024-12-15 ASSESSMENT — COLUMBIA-SUICIDE SEVERITY RATING SCALE - C-SSRS
1. IN THE PAST MONTH, HAVE YOU WISHED YOU WERE DEAD OR WISHED YOU COULD GO TO SLEEP AND NOT WAKE UP?: NO
6. HAVE YOU EVER DONE ANYTHING, STARTED TO DO ANYTHING, OR PREPARED TO DO ANYTHING TO END YOUR LIFE?: NO
2. HAVE YOU ACTUALLY HAD ANY THOUGHTS OF KILLING YOURSELF?: NO

## 2024-12-15 ASSESSMENT — LIFESTYLE VARIABLES
EVER HAD A DRINK FIRST THING IN THE MORNING TO STEADY YOUR NERVES TO GET RID OF A HANGOVER: NO
EVER FELT BAD OR GUILTY ABOUT YOUR DRINKING: NO
TOTAL SCORE: 0
HAVE YOU EVER FELT YOU SHOULD CUT DOWN ON YOUR DRINKING: NO
HAVE PEOPLE ANNOYED YOU BY CRITICIZING YOUR DRINKING: NO

## 2024-12-15 ASSESSMENT — PAIN SCALES - GENERAL: PAINLEVEL_OUTOF10: 1

## 2024-12-15 ASSESSMENT — PAIN DESCRIPTION - LOCATION: LOCATION: CHEST

## 2024-12-15 NOTE — DISCHARGE INSTRUCTIONS
Seek immediate medical attention if you develop: worsening chest pain, new chest pain, nausea, vomiting, weakness, numbness, tingling, excessive sweating, shortness of breath, difficulty breathing, loss of motion in your arms or legs, or any new or worsening symptoms.    Please follow-up with your cardiology team as he was possible.  I do believe you are having a minor cardiac event and do recommend you stay in the hospital.  Should you change your mind regarding staying in the hospital I recommend he immediately come back to the emergency department.  He has significant cardiovascular risk factors for heart disease and had exertional chest pain today.  You do have a mildly elevated cardiac enzyme.  You did have improvement in her chest pain with nitroglycerin here in the emergency department.  Going home is potentially hazardous to your health and could result in permanent disability, death or heart attack.

## 2024-12-15 NOTE — ED PROVIDER NOTES
HPI   Chief Complaint   Patient presents with    Chest Pain       59-year-old male with with a past medical history of dilated cardiomyopathy, pacemaker with ICD placement, heart failure with reduced ejection fraction, COPD, hypertension, hyperlipidemia presents for chest pain that began on exertion while having intercourse this afternoon.  Has been ongoing for the past 2 hours.  Denies any other recent episodes of chest pain.  States it is in the center of his chest with radiation of the left side.  He endorses some associated shortness of breath and nausea with it that is since resolved.  He denies any cough, fever, chills, sore throat or rhinorrhea.  He denies any peripheral edema or palpitations.  Denies any ICD shocks.              Patient History   Past Medical History:   Diagnosis Date    Cardiomyopathy, unspecified 07/21/2022    Cardiomyopathy    Encounter for adjustment and management of automatic implantable cardiac defibrillator 09/18/2020    ICD (implantable cardioverter-defibrillator) battery depletion    Encounter for other preprocedural examination 08/17/2021    Pre-op exam    Encounter for other preprocedural examination 09/27/2021    Pre-op examination    Encounter for other preprocedural examination 07/21/2021    Preoperative clearance    Encounter for screening for malignant neoplasm of colon 05/04/2021    Screen for colon cancer    Encounter for screening for malignant neoplasm of prostate 11/02/2021    Screening PSA (prostate specific antigen)    Encounter for screening for malignant neoplasm of prostate 05/04/2021    Screening PSA (prostate specific antigen)    Other fecal abnormalities 06/26/2021    Positive FIT (fecal immunochemical test)    Other symptoms and signs involving the nervous system 01/09/2018    Suspected sleep apnea    Personal history of other diseases of the circulatory system     History of coronary atherosclerosis    Personal history of other diseases of the digestive system  06/30/2021    History of constipation    Right lower quadrant pain 06/03/2021    Right inguinal pain    Tobacco abuse counseling 03/12/2019    Encounter for smoking cessation counseling     Past Surgical History:   Procedure Laterality Date    OTHER SURGICAL HISTORY  08/18/2021    Inguinal hernia repair    OTHER SURGICAL HISTORY  07/30/2021    Colonoscopy    OTHER SURGICAL HISTORY  07/14/2021    Permanent pacemaker insertion    OTHER SURGICAL HISTORY  07/14/2021    Cardioverter defibrillator insertion    OTHER SURGICAL HISTORY  07/14/2021    Finger surgical procedure     Family History   Problem Relation Name Age of Onset    Hyperlipidemia Mother      Hyperlipidemia Father       Social History     Tobacco Use    Smoking status: Every Day     Current packs/day: 0.50     Types: Cigarettes    Smokeless tobacco: Never   Vaping Use    Vaping status: Never Used   Substance Use Topics    Alcohol use: Not Currently    Drug use: Yes     Frequency: 7.0 times per week     Types: Marijuana       Physical Exam   ED Triage Vitals [12/15/24 1435]   Temperature Heart Rate Respirations BP   36.4 °C (97.5 °F) 58 18 159/88      Pulse Ox Temp src Heart Rate Source Patient Position   94 % -- Monitor Lying      BP Location FiO2 (%)     Left arm --       Physical Exam  Vitals and nursing note reviewed.   Constitutional:       General: He is not in acute distress.     Appearance: He is well-developed.   HENT:      Head: Normocephalic and atraumatic.      Nose: No congestion or rhinorrhea.   Eyes:      Conjunctiva/sclera: Conjunctivae normal.   Cardiovascular:      Rate and Rhythm: Normal rate and regular rhythm.      Pulses: Normal pulses.      Heart sounds: No murmur heard.  Pulmonary:      Effort: Pulmonary effort is normal. No respiratory distress.      Breath sounds: No stridor. Decreased breath sounds present. No wheezing, rhonchi or rales.   Chest:      Chest wall: No mass or tenderness.      Comments: Left-sided pacemaker pocket  clean dry and intact.  Abdominal:      General: Bowel sounds are normal. There is no distension.      Palpations: Abdomen is soft.      Tenderness: There is no abdominal tenderness. There is no guarding or rebound.   Musculoskeletal:         General: No swelling.      Cervical back: Neck supple. No rigidity.      Right lower leg: No edema.      Left lower leg: No edema.   Skin:     General: Skin is warm and dry.      Capillary Refill: Capillary refill takes less than 2 seconds.      Findings: No rash.   Neurological:      Mental Status: He is alert.      Cranial Nerves: No cranial nerve deficit.      Sensory: No sensory deficit.      Motor: No weakness.      Gait: Gait normal.   Psychiatric:         Mood and Affect: Mood normal.         Behavior: Behavior normal.         Thought Content: Thought content normal.           ED Course & MDM   ED Course as of 12/15/24 1547   Sun Dec 15, 2024   1452 EKG performed at 14: 46 and independently reviewed by provider: Reveals ventricular paced rhythm with a rate of 60 bpm, leftward axis, prolonged QRS of 158 ms and prolonged QTc of 496 ms, no ST changes, no T wave abnormalities, no ectopy. No STEMI.  Negative by modified Sgarbossa criteria for acute STEMI. [TL]   1511 Negative D-dimer this low Wells score patient I do believe safely secludes pulmonary embolism. [TL]   1522 Mildly elevated troponin.  Pending chest x-ray at this time and repeat troponin. [TL]   1525 EKG performed at 15: 21 and independently reviewed by provider: Reveals NSR with a rate of 70 bpm, leftward axis, normal intervals, no ST changes, no T wave abnormalities, no ectopy. No STEMI.  Incomplete right bundle branch block.  Prior anterior infarct appreciated. [TL]   1541 Chest Radiograph interpretation: No acute cardiopulmonary process.  No pneumothorax, widened mediastinum, pneumonia.  Pacemaker appreciated. [TL]   1546 Updated patient on his findings.  He does not want a stay in the hospital.  I did advise  him of my concerns including significant cardiovascular risk factors, chest pain that resolved with nitroglycerin that began after exertional activity at home.  He expressed understanding of this.  I strongly recommended he stay in the hospital to see cardiology team.  I was planning on anticoagulating with a shot of Lovenox.  He is not willing to stay in the hospital.  I will give him 324 mg of aspirin at this time.  He is aware the potential of going home and having a heart attack which could lead to death, neurologic disability, other physical disability or permanent pain.  I did strongly recommend a follow-up appointment as soon as possible with his primary care physician as well as cardiologist.  I did recommend return to the emergency [place] as soon as possible should he change his mind.  He states that he is not able to stay secondary to need to work for a living and he cannot afford the loss of income.     [TL]   1547 Patient is deemed by provider by have capacity to make medical decisions for himself/herself. The patient is alert and oriented to person, place, time, and situation. The patient is able to communicate their wishes in  a clear and concise manner. The patient has insight into their current symptoms and situation including benefits, risks, and alternatives to recommended treatment. The patient is able to repeat back to provider that deciding to go against the recommendations of the provider could result in permanent disability, loss of function, permanent pain, or death. The patient expresses a clear thought process and is able to explain reasoning for their chosen decision. [TL]   1547 The patient wishes to sign out AGAINST MEDICAL ADVICE. The nursing staff and physicians pleaded with the patient to stay for further investigations and evaluation. The patient understands and appreciates the admission diagnosis and its prognosis and the likelihood of risks and benefits of leaving the hospital. The  patient signed documentation that they would like to leave at their own insistence and against the advice of the physicians. The patient was advised of the possible dangers to their life and health from this departure, and the patient assumes the risks and consequences involved and releases the staff and the Medical Center from any liability in connection with leaving [facility] AGAINST MEDICAL ADVICE. The patient understands that we cannot fully assess the patient for the current complaint at this time because they do not want us to perform our investigations. The patient understands and the possibilities of death and disability and consequences of leaving AGAINST MEDICAL ADVICE. The patient has been informed of the dangers of leaving AGAINST MEDICAL ADVICE and still is insistent upon signing out. The patient has arrived at this decision without being subjected to coercion and with a full understanding in appreciation of the risks, benefits, and alternatives of the decision. The patient is not intoxicated.   [TL]      ED Course User Index  [TL] Ashu Robbins DO         Diagnoses as of 12/15/24 1547   NSTEMI (non-ST elevated myocardial infarction) (Multi)                 No data recorded     Bill Coma Scale Score: 15 (12/15/24 1436 : Olga Jimenez RN)                           Medical Decision Making  59-year-old male presenting for exertional chest pain.  Significant cardiovascular risk factors.  EKG immediately obtained and negative by Sgarbossa criteria with ventricular paced rhythm.  Serial troponin, D-dimer to be obtained.  Chest x-ray to be obtained.  Basic laboratory studies to be obtained.  Patient is going to have elevated heart score should workup otherwise been negative.  Please see separate documentation.  I advised patient my recommendation for observation admission should workup be negative.  Lungs appear to be chronically diminished but no acute wheezing, increased work of  breathing.  No sign of prolonged expiratory phase.  I do not suspect acute COPD exacerbation at this time.  Patient states that his presentation does not seem similar to prior COPD exacerbations.  Will hold on breathing treatments or steroids although this was considered.  No other recent fever or cough to be suspicion for pneumonia.  I did give consideration but I do not believe the patient requires further evaluation for esophageal perforation or aortic dissection.  Will attempt nitroglycerin at this time.      Patient was ultimately diagnosed with a type I NSTEMI.  Chest pain did improve with nitroglycerin to the emergency department.  With plan was to anticoagulate and give aspirin however patient refused admission.  He was made aware of his diagnosis and the concern for this.  He is made aware the potential risks of going home and not seeking further evaluation and treatment.  He expressed understanding.  Please see separate ED course for documentation.  Patient ultimately left hospital AGAINST MEDICAL ADVICE.    Procedure  Procedures     Ashu Robbins DO  12/15/24 1548

## 2024-12-18 LAB
ATRIAL RATE: 60 BPM
P AXIS: 109 DEGREES
P OFFSET: 175 MS
P ONSET: 123 MS
PR INTERVAL: 130 MS
Q ONSET: 188 MS
QRS COUNT: 10 BEATS
QRS DURATION: 158 MS
QT INTERVAL: 496 MS
QTC CALCULATION(BAZETT): 496 MS
QTC FREDERICIA: 496 MS
R AXIS: -77 DEGREES
T AXIS: 93 DEGREES
T OFFSET: 436 MS
VENTRICULAR RATE: 60 BPM

## 2024-12-23 ENCOUNTER — HOSPITAL ENCOUNTER (OUTPATIENT)
Dept: CARDIOLOGY | Facility: CLINIC | Age: 59
Discharge: HOME | End: 2024-12-23
Payer: COMMERCIAL

## 2024-12-23 DIAGNOSIS — Z95.810 BIVENTRICULAR ICD (IMPLANTABLE CARDIOVERTER-DEFIBRILLATOR) IN PLACE: ICD-10-CM

## 2024-12-23 DIAGNOSIS — I42.0 DILATED CARDIOMYOPATHY (MULTI): ICD-10-CM

## 2024-12-23 DIAGNOSIS — I47.29 PAROXYSMAL VENTRICULAR TACHYCARDIA (MULTI): ICD-10-CM

## 2024-12-24 ENCOUNTER — APPOINTMENT (OUTPATIENT)
Dept: PHARMACY | Facility: HOSPITAL | Age: 59
End: 2024-12-24
Payer: COMMERCIAL

## 2024-12-24 DIAGNOSIS — J44.9 CHRONIC OBSTRUCTIVE PULMONARY DISEASE, UNSPECIFIED COPD TYPE (MULTI): ICD-10-CM

## 2024-12-24 NOTE — PROGRESS NOTES
Patient ID: Armaan De Jesus is a 59 y.o. male who presents for COPD.  Pt is here for Follow Up appointment.     PCP/Referring Provider: Malik Elaine MD  Last Visit: 9.13.24  Next visit: 3.14.25    Verbal consent to manage patient's drug therapy was obtained from patient. They were informed they may decline to participate or withdraw from participation in pharmacy services at any time.      Subjective   Treatment Adherence:   Patient did take medications today.   Number of missed doses in last 7 days: 0.       Preferred pharmacy: [facility]  Can patient afford prescribed medications: Yes, no issues reported    Interval History:  Left AMA from ED on 12/15/24 - NSTEMI      HPI  PULMONARY ASSESSMENT  Patient has been diagnosed with: COPD  does not see a pulmonologist  Last visit: 9/2020 at [facility]  has not had PFT's completed in last 2 years - last in 2020    Current Regimen:  Albuterol PRN  Anoro Ellipta 1 puff daily    Historical Treatment:  Bevespi - unknown reason    Symptom Management:  Current symptoms: dyspnea  Triggers: exertion  Alleviating factors: takes albuterol, rest    Exacerbation Hx:  When was your last hospitalization for an exacerbation? None found  When was the last time you were treated with antibiotics and/or steroids? 2022     Rescue Inhaler Use:  How often do you use your rescue inhaler? Couple times per day    Appropriate Inhaler Technique: yes      Vaccines:  Influenza: Date [2018]  PPSV23: Date [2024]  COVID: Date [2021]      Review of Systems    Objective     BP Readings from Last 4 Encounters:   12/15/24 122/69   09/13/24 102/62   08/20/24 128/76   03/12/24 116/72      There were no vitals filed for this visit.     Labs  Creatinine   Date Value Ref Range Status   12/15/2024 0.84 0.50 - 1.30 mg/dL Final     eGFR   Date Value Ref Range Status   12/15/2024 >90 >60 mL/min/1.73m*2 Final     Comment:     Calculations of estimated GFR are performed using the 2021 CKD-EPI Study Refit equation without the  race variable for the IDMS-Traceable creatinine methods.  https://jasn.asnjournals.org/content/early/2021/09/22/ASN.5828497079     Sodium   Date Value Ref Range Status   12/15/2024 139 136 - 145 mmol/L Final     Potassium   Date Value Ref Range Status   12/15/2024 4.5 3.5 - 5.3 mmol/L Final     Chloride   Date Value Ref Range Status   12/15/2024 101 98 - 107 mmol/L Final     Urea Nitrogen   Date Value Ref Range Status   12/15/2024 15 6 - 23 mg/dL Final     AST   Date Value Ref Range Status   12/15/2024 11 9 - 39 U/L Final        Lab Results   Component Value Date    BILITOT 0.5 12/15/2024    CALCIUM 10.1 12/15/2024    CO2 32 12/15/2024     12/15/2024    CREATININE 0.84 12/15/2024    GLUCOSE 159 (H) 12/15/2024    ALKPHOS 59 12/15/2024    K 4.5 12/15/2024    PROT 6.6 12/15/2024     12/15/2024    AST 11 12/15/2024    ALT 11 12/15/2024    BUN 15 12/15/2024    ANIONGAP 11 12/15/2024    MG 1.62 02/23/2024    ALBUMIN 4.1 12/15/2024    LIPASE 104 (H) 03/20/2020    GFRMALE 88 09/12/2023     Lab Results   Component Value Date    TRIG 199 (H) 09/12/2023    CHOL 142 09/12/2023    HDL 48.8 09/12/2023     Lab Results   Component Value Date    HGBA1C 8.2 07/11/2024       Current Outpatient Medications   Medication Instructions    albuterol 90 mcg/actuation inhaler 2 puffs, inhalation, Every 6 hours PRN    atorvastatin (LIPITOR) 40 mg, oral, Daily    blood sugar diagnostic (FreeStyle Test) strip Use as directed.    blood-glucose meter (OneTouch Ultra2 Meter) misc TEST BID    carvedilol (COREG) 25 mg, oral, 2 times daily (morning and late afternoon)    cyclobenzaprine (FLEXERIL) 10 mg, oral, Nightly    digoxin (LANOXIN) 125 mcg, oral, Daily    gabapentin (NEURONTIN) 800 mg, oral, Daily RT    Jardiance 10 mg, oral, Daily    lancets misc Use as directed    lisinopril 40 mg, oral, Daily    metFORMIN (GLUCOPHAGE) 500 mg, oral, Daily with breakfast    OneTouch Ultra Test strip Use as directed    pioglitazone (ACTOS) 15 mg,  oral, Daily    potassium chloride CR (Klor-Con M20) 20 mEq ER tablet 20 mEq, oral, 2 times daily, Do not crush or chew.    spironolactone (ALDACTONE) 25 mg, oral, Daily    umeclidinium-vilanteroL (Anoro Ellipta) 62.5-25 mcg/actuation blister with device 1 puff, inhalation, Daily        DRUG INTERACTIONS:  Digoxin, atorvastatin and spironolactone: Concurrent use may result in elevated levels of and toxicity from digoxin. Symptoms of digoxin toxicity can include anorexia, nausea, vomiting, headache, fatigue, malaise, drowsiness, generalized muscle weakness, disorientation, hallucinations, visual disturbances, and arrhythmias. Ensure patient Is aware of monitoring parameters     Assessment/Plan   Problem List Items Addressed This Visit             ICD-10-CM    COPD (chronic obstructive pulmonary disease) (Multi) J44.9     ASSESSMENT:  Patient with COPD that is stable and reasonably well controlled. Based on CAT score, exacerbation history, and eosinophil count, patient is GOLD Group B and LAMA/LABA therapy is recommended.   Started using Anoro daily. No issues reported. Has not needed albuterol.    PLAN:  Medication Changes:  CONTINUE:  Albuterol HFA PRN  Anoro Ellipta 1 puff daily    Monitoring and Education:  Anoro Ellipta Education:  Counseled patient on Anoro MOA, expectations, side effects, duration of therapy, administration, and monitoring parameters.  Open cover of inhaler, exhale away from the mouthpiece, place lips around the mouthpiece, inhale slowly and deeply, exhale again away from the inhaler  If using Breo, Arnuity or Trelegy, must rinse mouth with water after use  Side effects include: nose/throat irritation, oral candidiasis, anxiety, high heart rate, dry mouth and/or bitter taste  All questions and concerns addressed.              Immunizations needed: DTaP, Hep A, Hep B, Varicella, Influenza, Prevnar, and COVID booster    Labs ordered:  none     Referrals:  none     Follow-up: 3 months     Patient  was provided with PharmD phone number and encouraged to reach out with any questions or concerns Prior to next appointment or ask provider for another pharmacy referral.    Time spent with pt: Total length of time 110 (minutes) of the encounter and more than 50% was spent counseling the patient.    Thank you for allowing to take part in the care of this patient.    Bruna Dykes PharmD, TERRENCE  Clinical Pharmacist  [phone]  [email or web address]    Continue all meds under the continuation of care with the referring provider and clinical pharmacy team.    Verbal consent to manage patient's drug therapy was obtained from the patient. They were informed they may decline to participate or withdraw from participation in pharmacy services at any time.

## 2024-12-24 NOTE — ASSESSMENT & PLAN NOTE
ASSESSMENT:  Patient with COPD that is stable and reasonably well controlled. Based on CAT score, exacerbation history, and eosinophil count, patient is GOLD Group B and LAMA/LABA therapy is recommended.   Started using Anoro daily. No issues reported. Has not needed albuterol.    PLAN:  Medication Changes:  CONTINUE:  Albuterol HFA PRN  Anoro Ellipta 1 puff daily    Monitoring and Education:  Anoro Ellipta Education:  Counseled patient on Anoro MOA, expectations, side effects, duration of therapy, administration, and monitoring parameters.  Open cover of inhaler, exhale away from the mouthpiece, place lips around the mouthpiece, inhale slowly and deeply, exhale again away from the inhaler  If using Breo, Arnuity or Trelegy, must rinse mouth with water after use  Side effects include: nose/throat irritation, oral candidiasis, anxiety, high heart rate, dry mouth and/or bitter taste  All questions and concerns addressed.

## 2024-12-27 ENCOUNTER — APPOINTMENT (OUTPATIENT)
Dept: CARDIOLOGY | Facility: CLINIC | Age: 59
End: 2024-12-27
Payer: COMMERCIAL

## 2024-12-27 VITALS
OXYGEN SATURATION: 92 % | HEART RATE: 71 BPM | BODY MASS INDEX: 28.25 KG/M2 | HEIGHT: 76 IN | RESPIRATION RATE: 18 BRPM | SYSTOLIC BLOOD PRESSURE: 118 MMHG | DIASTOLIC BLOOD PRESSURE: 60 MMHG | WEIGHT: 232 LBS

## 2024-12-27 DIAGNOSIS — E78.00 HYPERCHOLESTEROLEMIA: ICD-10-CM

## 2024-12-27 DIAGNOSIS — I50.22 CHRONIC SYSTOLIC CONGESTIVE HEART FAILURE: ICD-10-CM

## 2024-12-27 DIAGNOSIS — R07.9 CHEST PAIN, UNSPECIFIED TYPE: Primary | ICD-10-CM

## 2024-12-27 DIAGNOSIS — Z95.810 BIVENTRICULAR ICD (IMPLANTABLE CARDIOVERTER-DEFIBRILLATOR) IN PLACE: ICD-10-CM

## 2024-12-27 DIAGNOSIS — I10 ESSENTIAL HYPERTENSION: ICD-10-CM

## 2024-12-27 DIAGNOSIS — I25.10 ARTERIOSCLEROSIS OF CORONARY ARTERY: ICD-10-CM

## 2024-12-27 DIAGNOSIS — I42.0 DILATED CARDIOMYOPATHY (MULTI): ICD-10-CM

## 2024-12-27 DIAGNOSIS — I42.9 CARDIOMYOPATHY, UNSPECIFIED TYPE (MULTI): ICD-10-CM

## 2024-12-27 PROCEDURE — 4010F ACE/ARB THERAPY RXD/TAKEN: CPT | Performed by: NURSE PRACTITIONER

## 2024-12-27 PROCEDURE — 4004F PT TOBACCO SCREEN RCVD TLK: CPT | Performed by: NURSE PRACTITIONER

## 2024-12-27 PROCEDURE — 99214 OFFICE O/P EST MOD 30 MIN: CPT | Performed by: NURSE PRACTITIONER

## 2024-12-27 PROCEDURE — 3052F HG A1C>EQUAL 8.0%<EQUAL 9.0%: CPT | Performed by: NURSE PRACTITIONER

## 2024-12-27 PROCEDURE — 3008F BODY MASS INDEX DOCD: CPT | Performed by: NURSE PRACTITIONER

## 2024-12-27 PROCEDURE — 3074F SYST BP LT 130 MM HG: CPT | Performed by: NURSE PRACTITIONER

## 2024-12-27 PROCEDURE — 3078F DIAST BP <80 MM HG: CPT | Performed by: NURSE PRACTITIONER

## 2024-12-27 RX ORDER — LANCETS 33 GAUGE
EACH MISCELLANEOUS
COMMUNITY
Start: 2024-09-30

## 2024-12-27 RX ORDER — DIGOXIN 125 MCG
125 TABLET ORAL DAILY
Qty: 90 TABLET | Refills: 3 | Status: SHIPPED | OUTPATIENT
Start: 2024-12-27 | End: 2025-12-27

## 2024-12-27 RX ORDER — ATORVASTATIN CALCIUM 40 MG/1
40 TABLET, FILM COATED ORAL DAILY
Qty: 90 TABLET | Refills: 3 | Status: SHIPPED | OUTPATIENT
Start: 2024-12-27 | End: 2025-12-27

## 2024-12-27 RX ORDER — SPIRONOLACTONE 25 MG/1
25 TABLET ORAL DAILY
Qty: 90 TABLET | Refills: 3 | Status: SHIPPED | OUTPATIENT
Start: 2024-12-27 | End: 2025-12-27

## 2024-12-27 RX ORDER — CARVEDILOL 25 MG/1
25 TABLET ORAL
Qty: 180 TABLET | Refills: 3 | Status: SHIPPED | OUTPATIENT
Start: 2024-12-27 | End: 2025-12-27

## 2024-12-27 RX ORDER — LISINOPRIL 40 MG/1
40 TABLET ORAL DAILY
Qty: 90 TABLET | Refills: 3 | Status: SHIPPED | OUTPATIENT
Start: 2024-12-27 | End: 2025-12-27

## 2024-12-27 NOTE — PROGRESS NOTES
Name : Armaan De Jesus   : 1965   MRN : [id number]   ENC Date : 2024    CC: Chest Pain and Hospital Follow-up     HPI:    Armaan De Jesus is a 59 y.o. male with PMHx sig for NIDCM, Chronic Systolic Heart Failure EF 20% (echo 2017), s/p ICD, mild CAD ([facility] 2016), HTN, HLD, DM II, COPD & Active Smoker 1/2PPD who presents today as above.    Reports he was having intercourse, started to have chest pain. Chest pain persisted more than 30 mins so he called EMS. Taken to [facility]. CP resolved with nitroglycerin. Only 1 troponin drawn = 28, assuming that only 1 was drawn as Armaan left AMA. Dx: NSTEMI type I.    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 Reports activity is limited by his legs. Knee replacement last year & feels like he is losing leg muscles. Sleeps on 1 pillow.    CV Diagnostics:  Echo 24:   1. Left ventricular systolic function is moderately to severely decreased with a 30-35% estimated ejection fraction.   2. Poorly visualized anatomical structures due to suboptimal image quality.   3. Increased LV mass.   4. Spectral Doppler shows an impaired relaxation pattern of left ventricular diastolic filling.   5. There is moderate eccentric left ventricular hypertrophy.   6. The estimated RVSP is 17 mm.   7. Left ventricular cavity size is moderate to severely dilated.   8. There is global hypokinesis of the left ventricle with minor regional variations.    ROS: unless otherwise noted in the history of present illness, all other systems were reviewed and they are negative for complaints     Allergies:  Patient has no known allergies.    Current Outpatient Medications   Medication Instructions    albuterol 90 mcg/actuation inhaler 2 puffs, inhalation, Every 6 hours PRN    atorvastatin (LIPITOR) 40 mg, oral, Daily    blood sugar diagnostic (FreeStyle Test) strip Use as directed.    blood-glucose meter (OneTouch Ultra2 Meter) misc TEST BID     carvedilol (COREG) 25 mg, oral, 2 times daily (morning and late afternoon)    cyclobenzaprine (FLEXERIL) 10 mg, oral, Nightly    digoxin (LANOXIN) 125 mcg, oral, Daily    empagliflozin (JARDIANCE) 10 mg, oral, Daily    gabapentin (NEURONTIN) 800 mg, oral, Daily RT    lancets misc Use as directed    lisinopril 40 mg, oral, Daily    metFORMIN (GLUCOPHAGE) 500 mg, oral, Daily with breakfast    OneTouch Delica Plus Lancet 33 gauge misc use as directed    OneTouch Ultra Test strip Use as directed    potassium chloride CR (Klor-Con M20) 20 mEq ER tablet 20 mEq, oral, 2 times daily, Do not crush or chew.    spironolactone (ALDACTONE) 25 mg, oral, Daily    umeclidinium-vilanteroL (Anoro Ellipta) 62.5-25 mcg/actuation blister with device 1 puff, inhalation, Daily        Last Labs:  CBC  Lab Results   Component Value Date    WBC 7.4 12/15/2024    HGB 14.8 12/15/2024    HCT 47.9 12/15/2024    MCV 96 12/15/2024     12/15/2024       CMP  Lab Results   Component Value Date    CALCIUM 10.1 12/15/2024    PROT 6.6 12/15/2024    ALBUMIN 4.1 12/15/2024    AST 11 12/15/2024    ALT 11 12/15/2024    ALKPHOS 59 12/15/2024    BILITOT 0.5 12/15/2024       BMP   Lab Results   Component Value Date     12/15/2024    K 4.5 12/15/2024     12/15/2024    CO2 32 12/15/2024    GLUCOSE 159 (H) 12/15/2024    BUN 15 12/15/2024    CREATININE 0.84 12/15/2024       LIPID PANEL   Lab Results   Component Value Date    CHOL 142 09/12/2023    TRIG 199 (H) 09/12/2023    HDL 48.8 09/12/2023    CHHDL 2.9 09/12/2023    LDLF 53 09/12/2023    VLDL 40 09/12/2023    NHDL 91 05/04/2021       RENAL FUNCTION PANEL   Lab Results   Component Value Date    GLUCOSE 159 (H) 12/15/2024     12/15/2024    K 4.5 12/15/2024     12/15/2024    CO2 32 12/15/2024    ANIONGAP 11 12/15/2024    BUN 15 12/15/2024    CREATININE 0.84 12/15/2024    GFRMALE 88 09/12/2023    CALCIUM 10.1 12/15/2024    ALBUMIN 4.1 12/15/2024        Lab Results   Component Value  "Date     (H) 12/15/2024    HGBA1C 8.2 07/11/2024     I have reviewed the above labs & diagnostics    Last Recorded Vitals:  Vitals:    12/27/24 1143   BP: 118/60   BP Location: Left arm   Patient Position: Sitting   Pulse: 71   Resp: 18   SpO2: 92%   Weight: 105 kg (232 lb)   Height: 1.93 m (6' 4\")     Physical Exam:  On exam Mr. Armaan De Jesus appears his stated age, is alert and oriented x3, and in no acute distress. His sclera are anicteric and his oropharynx has moist mucous membranes. His neck is supple and without thyromegaly. The JVP is ~5 cm of water above the right atrium. His cardiac exam has regular rhythm, normal S1, S2. No S3/4. There are no murmurs. His lungs are clear to auscultation bilaterally and there is no dullness to percussion. His abdomen is soft, nontender with normoactive bowel sounds. There is no HJR. The extremities are warm and without edema. The skin is dry. There is no rash present. The distal pulses are 2-3+ in all four extremities. His mood and affect are appropriate for todays encounter.     Assessment/Plan:  Nonischemic Dilated Cardiomyopathy. HFrEF (20%-->30-35%). Stage C. NYHA Class I-II.  Most recent   - he was unable to complete cMRI 2/2 claustrophobia   - c/w Coreg 25mg BID  - c/w Lisinopril 40mg every day  - c/w Spironolactone 25mg every day  - c/w Jardiance 10 mg every day   - c/w Digoxin 0.125mg every day, dig level 0.49 on 2/23/24  - needs to stop smoking  - on Actos again told him to STOP    2. ICD in situ. Established with Dr. Rodriguez    3. Angina. Known mild CAD on [facility] 2016.  Multiple risk factors for progression of disease. Further evaluation with Lexiscan  Continues on Atorvastatin 40mg every day.     4. Hypertension. /60 mmhg today. Will try and get him switched from Lisinopril to entresto next visit. Also re-evaluate increasing spironolactone & stopping potassium supp    5. DLD. Tolerating statin well    6. Tobacco Abuse. Educated on the risks of " smoking & reviewed benefits of cessation  - Discussed need for complete cessation    7. COPD. Following with PCP    8.  DM II. A1C 8.9. managed by PCP. Permanently stop Actos 2/2 HFrEF    Follow up with me after lexiscan Tracy M Schwab, APRN-CNP

## 2024-12-27 NOTE — PATIENT INSTRUCTIONS
- stop Actos immediately   - nuclear stress test  - Follow up with me after    It was so nice to see you! As always, I look forward to being your cardiac Nurse Practitioner. I am a huge believer in communicating with my patients. Please contact me at any time, if anything is not clear to you regarding anything we have discussed, or if new questions occur to you.

## 2025-01-09 ENCOUNTER — TELEPHONE (OUTPATIENT)
Dept: PRIMARY CARE | Facility: CLINIC | Age: 60
End: 2025-01-09
Payer: COMMERCIAL

## 2025-01-09 DIAGNOSIS — Z96.652 STATUS POST TOTAL LEFT KNEE REPLACEMENT: ICD-10-CM

## 2025-01-09 DIAGNOSIS — Z79.4 TYPE 2 DIABETES MELLITUS WITH KETOACIDOSIS WITHOUT COMA, WITH LONG-TERM CURRENT USE OF INSULIN: ICD-10-CM

## 2025-01-09 DIAGNOSIS — E11.10 TYPE 2 DIABETES MELLITUS WITH KETOACIDOSIS WITHOUT COMA, WITH LONG-TERM CURRENT USE OF INSULIN: ICD-10-CM

## 2025-01-09 NOTE — TELEPHONE ENCOUNTER
PATIENT AWARE THAT YOU ARE OUT OF THE OFFICE AND WANTED MESSAGE LEFT FOR WHEN YOU RETURN.    PATIENT IS CALLING - SAW TRACEY SCHWAB, CNP IN CARDIOLOGY ON 12/27/24 - TOLD PATIENT TO STOP ACTOS IMMEDIATELY BECAUSE IT WAS CAUSING HIS HEARTBEAT TO BE IRREGULAR - HIS BLOOD SUGARS HAVE BEEN RUNNING AROUND 175  SINCE THEN - WONDERING WHAT YOU CAN RECOMMEND?  PLEASE ADVISE.    [phone]

## 2025-01-10 RX ORDER — GLIMEPIRIDE 1 MG/1
1 TABLET ORAL
Qty: 30 TABLET | Refills: 1 | Status: SHIPPED | OUTPATIENT
Start: 2025-01-10 | End: 2025-03-11

## 2025-01-10 NOTE — TELEPHONE ENCOUNTER
Malik Elaine MD  [id number] [place] [place] [id number] Clinical Support Staff2 minutes ago (5:01 PM)       Please let the patient know I recommend starting glimepiride 1 mg daily 30 pills with 1 refill.  Have him continue to check his sugars every morning and update me next week Tuesday or Wednesday as this pill can make a big difference.  Please let him know

## 2025-01-13 ENCOUNTER — HOSPITAL ENCOUNTER (OUTPATIENT)
Dept: CARDIOLOGY | Facility: CLINIC | Age: 60
Discharge: HOME | End: 2025-01-13
Payer: COMMERCIAL

## 2025-01-13 DIAGNOSIS — I42.0 DILATED CARDIOMYOPATHY (MULTI): ICD-10-CM

## 2025-01-13 DIAGNOSIS — Z95.810 BIVENTRICULAR ICD (IMPLANTABLE CARDIOVERTER-DEFIBRILLATOR) IN PLACE: ICD-10-CM

## 2025-01-13 DIAGNOSIS — I47.29 PAROXYSMAL VENTRICULAR TACHYCARDIA (MULTI): ICD-10-CM

## 2025-01-15 ENCOUNTER — APPOINTMENT (OUTPATIENT)
Dept: CARDIOLOGY | Facility: CLINIC | Age: 60
End: 2025-01-15
Payer: COMMERCIAL

## 2025-01-23 ENCOUNTER — APPOINTMENT (OUTPATIENT)
Dept: CARDIOLOGY | Facility: CLINIC | Age: 60
End: 2025-01-23
Payer: COMMERCIAL

## 2025-01-24 ENCOUNTER — APPOINTMENT (OUTPATIENT)
Dept: CARDIOLOGY | Facility: CLINIC | Age: 60
End: 2025-01-24
Payer: COMMERCIAL

## 2025-01-24 VITALS
WEIGHT: 227 LBS | HEIGHT: 76 IN | DIASTOLIC BLOOD PRESSURE: 52 MMHG | OXYGEN SATURATION: 100 % | HEART RATE: 60 BPM | SYSTOLIC BLOOD PRESSURE: 82 MMHG | BODY MASS INDEX: 27.64 KG/M2

## 2025-01-24 DIAGNOSIS — R07.9 CHEST PAIN, UNSPECIFIED TYPE: Primary | ICD-10-CM

## 2025-01-24 DIAGNOSIS — I42.0 DILATED CARDIOMYOPATHY (MULTI): ICD-10-CM

## 2025-01-24 PROCEDURE — 4010F ACE/ARB THERAPY RXD/TAKEN: CPT | Performed by: NURSE PRACTITIONER

## 2025-01-24 PROCEDURE — 3008F BODY MASS INDEX DOCD: CPT | Performed by: NURSE PRACTITIONER

## 2025-01-24 PROCEDURE — 3078F DIAST BP <80 MM HG: CPT | Performed by: NURSE PRACTITIONER

## 2025-01-24 PROCEDURE — 3074F SYST BP LT 130 MM HG: CPT | Performed by: NURSE PRACTITIONER

## 2025-01-24 PROCEDURE — 99214 OFFICE O/P EST MOD 30 MIN: CPT | Performed by: NURSE PRACTITIONER

## 2025-01-24 NOTE — PROGRESS NOTES
Name : Armaan De Jesus   : 1965   MRN : [id number]   ENC Date : 2025    CC: Follow-up     HPI:    Armaan De Jesus is a 59 y.o. male with PMHx sig for NIDCM, Chronic Systolic Heart Failure EF 20% (echo 2017), s/p ICD, mild CAD ([facility] 2016), HTN, HLD, DM II, COPD & Active Smoker 1/2PPD who presents today as above.    Previous Encounter:  ED 12/15/24: Reports he was having intercourse, started to have chest pain. Chest pain persisted more than 30 mins so he called EMS. Taken to [facility]. CP resolved with nitroglycerin. Only 1 troponin drawn = 28, assuming that only 1 was drawn as Armaan left AMA. Dx: NSTEMI type I.    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 Reports activity is limited by his legs. Knee replacement last year & feels like he is losing leg muscles. Sleeps on 1 pillow.    OV 25: He is here today on follow up stress test, not for medication refills; however, testing is not done. He no showed the Lexiscan.    CV Diagnostics:  Echo 24:   1. Left ventricular systolic function is moderately to severely decreased with a 30-35% estimated ejection fraction.   2. Poorly visualized anatomical structures due to suboptimal image quality.   3. Increased LV mass.   4. Spectral Doppler shows an impaired relaxation pattern of left ventricular diastolic filling.   5. There is moderate eccentric left ventricular hypertrophy.   6. The estimated RVSP is 17 mm.   7. Left ventricular cavity size is moderate to severely dilated.   8. There is global hypokinesis of the left ventricle with minor regional variations.    ROS: unless otherwise noted in the history of present illness, all other systems were reviewed and they are negative for complaints     Allergies:  Patient has no known allergies.    Current Outpatient Medications   Medication Instructions    albuterol 90 mcg/actuation inhaler 2 puffs, inhalation, Every 6 hours PRN    atorvastatin (LIPITOR) 40  mg, oral, Daily    blood sugar diagnostic (FreeStyle Test) strip Use as directed.    blood-glucose meter (OneTouch Ultra2 Meter) misc TEST BID    carvedilol (COREG) 25 mg, oral, 2 times daily (morning and late afternoon)    cyclobenzaprine (FLEXERIL) 10 mg, oral, Nightly    digoxin (LANOXIN) 125 mcg, oral, Daily    empagliflozin (JARDIANCE) 10 mg, oral, Daily    gabapentin (NEURONTIN) 800 mg, oral, Daily RT    glimepiride (AMARYL) 1 mg, oral, Daily before breakfast    lancets misc Use as directed    lisinopril 40 mg, oral, Daily    metFORMIN (GLUCOPHAGE) 500 mg, oral, Daily with breakfast    OneTouch Delica Plus Lancet 33 gauge misc use as directed    OneTouch Ultra Test strip Use as directed    potassium chloride CR (Klor-Con M20) 20 mEq ER tablet 20 mEq, oral, 2 times daily, Do not crush or chew.    spironolactone (ALDACTONE) 25 mg, oral, Daily    umeclidinium-vilanteroL (Anoro Ellipta) 62.5-25 mcg/actuation blister with device 1 puff, inhalation, Daily        Last Labs:  CBC  Lab Results   Component Value Date    WBC 7.4 12/15/2024    HGB 14.8 12/15/2024    HCT 47.9 12/15/2024    MCV 96 12/15/2024     12/15/2024       CMP  Lab Results   Component Value Date    CALCIUM 10.1 12/15/2024    PROT 6.6 12/15/2024    ALBUMIN 4.1 12/15/2024    AST 11 12/15/2024    ALT 11 12/15/2024    ALKPHOS 59 12/15/2024    BILITOT 0.5 12/15/2024       BMP   Lab Results   Component Value Date     12/15/2024    K 4.5 12/15/2024     12/15/2024    CO2 32 12/15/2024    GLUCOSE 159 (H) 12/15/2024    BUN 15 12/15/2024    CREATININE 0.84 12/15/2024       LIPID PANEL   Lab Results   Component Value Date    CHOL 142 09/12/2023    TRIG 199 (H) 09/12/2023    HDL 48.8 09/12/2023    CHHDL 2.9 09/12/2023    LDLF 53 09/12/2023    VLDL 40 09/12/2023    NHDL 91 05/04/2021       RENAL FUNCTION PANEL   Lab Results   Component Value Date    GLUCOSE 159 (H) 12/15/2024     12/15/2024    K 4.5 12/15/2024     12/15/2024    CO2 32  "12/15/2024    ANIONGAP 11 12/15/2024    BUN 15 12/15/2024    CREATININE 0.84 12/15/2024    GFRMALE 88 09/12/2023    CALCIUM 10.1 12/15/2024    ALBUMIN 4.1 12/15/2024        Lab Results   Component Value Date     (H) 12/15/2024    HGBA1C 8.2 07/11/2024     I have reviewed the above labs & diagnostics    Last Recorded Vitals:  Vitals:    01/24/25 0957   BP: 82/52   BP Location: Left arm   Patient Position: Sitting   Pulse: 60   SpO2: 100%   Weight: 103 kg (227 lb)   Height: 1.93 m (6' 4\")     Physical Exam:  On exam Mr. Armaan De Jesus appears his stated age, is alert and oriented x3, and in no acute distress. His sclera are anicteric and his oropharynx has moist mucous membranes. His neck is supple and without thyromegaly. The JVP is ~5 cm of water above the right atrium. His cardiac exam has regular rhythm, normal S1, S2. No S3/4. There are no murmurs. His lungs are clear to auscultation bilaterally and there is no dullness to percussion. His abdomen is soft, nontender with normoactive bowel sounds. There is no HJR. The extremities are warm and without edema. The skin is dry. There is no rash present. The distal pulses are 2-3+ in all four extremities. His mood and affect are appropriate for todays encounter.     Assessment/Plan:  Nonischemic Dilated Cardiomyopathy. HFrEF (20%-->30-35%). Stage C. NYHA Class I-II.  Most recent   - he was unable to complete cMRI 2/2 claustrophobia   - c/w Coreg 25mg BID  - c/w Lisinopril 40mg every day  - c/w Spironolactone 25mg every day  - c/w Jardiance 10 mg every day   - c/w Digoxin 0.125mg every day, dig level 0.49 on 2/23/24  - needs to stop smoking    2. ICD in situ. Established with Dr. Rodriguez    3. Angina. Known mild CAD on [facility] 2016.  Multiple risk factors for progression of disease.   Continue Atorvastatin 40mg every day.   Reschedule the Lexiscan that he no showed.    4. Hypertension. BP well controlled on current regimen.    5. DLD. Tolerating statin well    6. " Tobacco Abuse. Educated on the risks of smoking & reviewed benefits of cessation  - Discussed need for complete cessation    7. COPD. Following with PCP    Virtual follow up with me after lexiscan Tracy M Schwab, APRN-CNP

## 2025-01-27 ENCOUNTER — APPOINTMENT (OUTPATIENT)
Dept: RADIOLOGY | Facility: HOSPITAL | Age: 60
End: 2025-01-27
Payer: COMMERCIAL

## 2025-02-07 ENCOUNTER — APPOINTMENT (OUTPATIENT)
Dept: CARDIOLOGY | Facility: CLINIC | Age: 60
End: 2025-02-07
Payer: COMMERCIAL

## 2025-02-24 ENCOUNTER — HOSPITAL ENCOUNTER (OUTPATIENT)
Dept: CARDIOLOGY | Facility: CLINIC | Age: 60
Discharge: HOME | End: 2025-02-24
Payer: COMMERCIAL

## 2025-02-24 DIAGNOSIS — I42.0 DILATED CARDIOMYOPATHY (MULTI): ICD-10-CM

## 2025-02-24 DIAGNOSIS — I47.29 PAROXYSMAL VENTRICULAR TACHYCARDIA (MULTI): ICD-10-CM

## 2025-02-24 DIAGNOSIS — Z95.810 BIVENTRICULAR ICD (IMPLANTABLE CARDIOVERTER-DEFIBRILLATOR) IN PLACE: ICD-10-CM

## 2025-02-24 PROCEDURE — 93296 REM INTERROG EVL PM/IDS: CPT

## 2025-02-27 ENCOUNTER — HOSPITAL ENCOUNTER (OUTPATIENT)
Dept: CARDIOLOGY | Facility: CLINIC | Age: 60
Discharge: HOME | End: 2025-02-27
Payer: COMMERCIAL

## 2025-02-27 DIAGNOSIS — Z95.810 BIVENTRICULAR ICD (IMPLANTABLE CARDIOVERTER-DEFIBRILLATOR) IN PLACE: ICD-10-CM

## 2025-02-27 DIAGNOSIS — I47.29 PAROXYSMAL VENTRICULAR TACHYCARDIA (MULTI): ICD-10-CM

## 2025-02-27 DIAGNOSIS — I42.0 DILATED CARDIOMYOPATHY (MULTI): ICD-10-CM

## 2025-03-03 ENCOUNTER — HOSPITAL ENCOUNTER (OUTPATIENT)
Dept: RADIOLOGY | Facility: CLINIC | Age: 60
Discharge: HOME | End: 2025-03-03
Payer: COMMERCIAL

## 2025-03-03 ENCOUNTER — HOSPITAL ENCOUNTER (OUTPATIENT)
Dept: CARDIOLOGY | Facility: CLINIC | Age: 60
Discharge: HOME | End: 2025-03-03
Payer: COMMERCIAL

## 2025-03-03 DIAGNOSIS — I10 ESSENTIAL HYPERTENSION: ICD-10-CM

## 2025-03-03 DIAGNOSIS — R07.9 CHEST PAIN, UNSPECIFIED: ICD-10-CM

## 2025-03-03 DIAGNOSIS — R07.9 CHEST PAIN, UNSPECIFIED TYPE: Primary | ICD-10-CM

## 2025-03-03 DIAGNOSIS — I42.0 DILATED CARDIOMYOPATHY (MULTI): ICD-10-CM

## 2025-03-03 DIAGNOSIS — I50.22 CHRONIC SYSTOLIC CONGESTIVE HEART FAILURE: ICD-10-CM

## 2025-03-03 DIAGNOSIS — I25.10 ARTERIOSCLEROSIS OF CORONARY ARTERY: ICD-10-CM

## 2025-03-03 DIAGNOSIS — I50.9 HEART FAILURE, UNSPECIFIED: ICD-10-CM

## 2025-03-03 PROCEDURE — A9502 TC99M TETROFOSMIN: HCPCS | Performed by: NURSE PRACTITIONER

## 2025-03-03 PROCEDURE — 3430000001 HC RX 343 DIAGNOSTIC RADIOPHARMACEUTICALS: Performed by: NURSE PRACTITIONER

## 2025-03-03 PROCEDURE — 93016 CV STRESS TEST SUPVJ ONLY: CPT | Performed by: INTERNAL MEDICINE

## 2025-03-03 PROCEDURE — 78452 HT MUSCLE IMAGE SPECT MULT: CPT | Performed by: NUCLEAR MEDICINE

## 2025-03-03 PROCEDURE — 93018 CV STRESS TEST I&R ONLY: CPT | Performed by: INTERNAL MEDICINE

## 2025-03-03 PROCEDURE — 93017 CV STRESS TEST TRACING ONLY: CPT

## 2025-03-03 PROCEDURE — 2500000004 HC RX 250 GENERAL PHARMACY W/ HCPCS (ALT 636 FOR OP/ED): Performed by: INTERNAL MEDICINE

## 2025-03-03 PROCEDURE — 78452 HT MUSCLE IMAGE SPECT MULT: CPT

## 2025-03-03 RX ORDER — REGADENOSON 0.08 MG/ML
0.4 INJECTION, SOLUTION INTRAVENOUS ONCE
Status: COMPLETED | OUTPATIENT
Start: 2025-03-03 | End: 2025-03-03

## 2025-03-03 RX ADMIN — REGADENOSON 0.4 MG: 0.08 INJECTION, SOLUTION INTRAVENOUS at 10:41

## 2025-03-03 RX ADMIN — TETROFOSMIN 10.3 MILLICURIE: 0.23 INJECTION, POWDER, LYOPHILIZED, FOR SOLUTION INTRAVENOUS at 09:05

## 2025-03-03 RX ADMIN — TETROFOSMIN 33 MILLICURIE: 0.23 INJECTION, POWDER, LYOPHILIZED, FOR SOLUTION INTRAVENOUS at 10:42

## 2025-03-04 DIAGNOSIS — I42.0 DILATED CARDIOMYOPATHY (MULTI): Primary | ICD-10-CM

## 2025-03-06 DIAGNOSIS — Z45.02 ICD (IMPLANTABLE CARDIOVERTER-DEFIBRILLATOR) BATTERY DEPLETION: Primary | ICD-10-CM

## 2025-03-06 DIAGNOSIS — I49.3 PVC (PREMATURE VENTRICULAR CONTRACTION): ICD-10-CM

## 2025-03-06 DIAGNOSIS — Z95.810 BIVENTRICULAR ICD (IMPLANTABLE CARDIOVERTER-DEFIBRILLATOR) IN PLACE: ICD-10-CM

## 2025-03-06 NOTE — PROGRESS NOTES
# Pre-procedure Planning  Procedure Type: Biventricular ICD - [employer]/SJM  Planned Date:  3/17/25 @ [facility] @ 3390  Anesthesia Needed: Moderate Sedation  Medication and Labwork Instructions:  Anticoagulation: None  Antiplatelet agents: None  SGLT2 Inhibitors: Yes: Start holding 3 days prior to date of procedure  GLP1 Weekly shots: Yes: Do not take your GLP-1 shot within 8 days of the date of the procedure  Other medication changes needed for procedure: Hold Morning of procedure  Contrast allergy? No  Imaging needed? No  Blood work ordered: CBC and BMP ordered within 21 days of procedure (encourage within 1week)

## 2025-03-10 ENCOUNTER — TELEPHONE (OUTPATIENT)
Dept: CARDIOLOGY | Facility: HOSPITAL | Age: 60
End: 2025-03-10
Payer: COMMERCIAL

## 2025-03-10 NOTE — TELEPHONE ENCOUNTER
Patient's CRT-D device has reached JUSTIN.  We had discussed at our prior visit in August but I did discuss with the patient again the procedure.  This will be his first battery replacement and we discussed the risk and benefits of the procedure including risk of bleeding infection damage to the wires requiring by replacement.  Patient's plan will be for moderate sedation and instructions were provided by the office in regards to his medication regimen on the day of the procedure.  Patient stated he understood the risk and benefits of the procedure and is agreeable and would like to proceed with CRT-D posterior replacement.

## 2025-03-14 ENCOUNTER — APPOINTMENT (OUTPATIENT)
Dept: PRIMARY CARE | Facility: CLINIC | Age: 60
End: 2025-03-14
Payer: COMMERCIAL

## 2025-03-14 VITALS
SYSTOLIC BLOOD PRESSURE: 130 MMHG | TEMPERATURE: 96.8 F | BODY MASS INDEX: 27.5 KG/M2 | OXYGEN SATURATION: 98 % | HEIGHT: 76 IN | RESPIRATION RATE: 18 BRPM | HEART RATE: 58 BPM | WEIGHT: 225.8 LBS | DIASTOLIC BLOOD PRESSURE: 82 MMHG

## 2025-03-14 DIAGNOSIS — Z12.5 SCREENING PSA (PROSTATE SPECIFIC ANTIGEN): ICD-10-CM

## 2025-03-14 DIAGNOSIS — I42.9 CARDIOMYOPATHY, UNSPECIFIED TYPE (MULTI): ICD-10-CM

## 2025-03-14 DIAGNOSIS — J44.9 CHRONIC OBSTRUCTIVE PULMONARY DISEASE, UNSPECIFIED COPD TYPE (MULTI): ICD-10-CM

## 2025-03-14 DIAGNOSIS — T40.601A: ICD-10-CM

## 2025-03-14 DIAGNOSIS — Z12.11 COLON CANCER SCREENING: ICD-10-CM

## 2025-03-14 DIAGNOSIS — I10 PRIMARY HYPERTENSION: ICD-10-CM

## 2025-03-14 DIAGNOSIS — Z79.4 TYPE 2 DIABETES MELLITUS WITH KETOACIDOSIS WITHOUT COMA, WITH LONG-TERM CURRENT USE OF INSULIN: ICD-10-CM

## 2025-03-14 DIAGNOSIS — E11.10 TYPE 2 DIABETES MELLITUS WITH KETOACIDOSIS WITHOUT COMA, WITH LONG-TERM CURRENT USE OF INSULIN: ICD-10-CM

## 2025-03-14 DIAGNOSIS — I48.91 ATRIAL FIBRILLATION, UNSPECIFIED TYPE (MULTI): Primary | ICD-10-CM

## 2025-03-14 PROCEDURE — 3075F SYST BP GE 130 - 139MM HG: CPT | Performed by: FAMILY MEDICINE

## 2025-03-14 PROCEDURE — 99214 OFFICE O/P EST MOD 30 MIN: CPT | Performed by: FAMILY MEDICINE

## 2025-03-14 PROCEDURE — 3079F DIAST BP 80-89 MM HG: CPT | Performed by: FAMILY MEDICINE

## 2025-03-14 PROCEDURE — 4010F ACE/ARB THERAPY RXD/TAKEN: CPT | Performed by: FAMILY MEDICINE

## 2025-03-14 PROCEDURE — 3008F BODY MASS INDEX DOCD: CPT | Performed by: FAMILY MEDICINE

## 2025-03-14 RX ORDER — GLIMEPIRIDE 2 MG/1
2 TABLET ORAL
Qty: 30 TABLET | Refills: 5 | Status: SHIPPED | OUTPATIENT
Start: 2025-03-14 | End: 2025-09-10

## 2025-03-14 ASSESSMENT — PATIENT HEALTH QUESTIONNAIRE - PHQ9
1. LITTLE INTEREST OR PLEASURE IN DOING THINGS: NOT AT ALL
SUM OF ALL RESPONSES TO PHQ9 QUESTIONS 1 AND 2: 0
2. FEELING DOWN, DEPRESSED OR HOPELESS: NOT AT ALL

## 2025-03-14 NOTE — PROGRESS NOTES
"Subjective   Patient ID: Armaan De Jesus is a 60 y.o. male who presents for Follow-up, Hypertension, Hyperlipidemia, and Diabetes.  HPI  Patient presents in office today for HTN, HLD, and DM follow up. Does try to follow a low sugar, low sodium, low fat diet. Stays active. Does  check sugars/BP at home. Denies any side effects from medications.      Patient is having surgery this month.   Patient is having surgery to replace his battery in his defibrillator.    He is having it at [facility].     Patient is due for colonoscopy.   Taking current medications which were reviewed.  Problem list discussed.    Overall doing well.  Eating okay.  Staying active.    Has no other new problem /question.     ROS  Constitutional- No activity change. No appetite change.  Eyes- Denies vision changes.  Respiratory- No shortness of breath.  Cardiovascular- No palpitations. No chest pain.  GI- No nausea or vomiting. No diarrhea or constipation. Denies abdominal pain.  Musculoskeletal- Denies joint swelling.  Extremities- No edema.  Neurological- Denies headaches. Denies dizziness.  Skin- No rashes.  Psychiatric/Behavioral- Denies significant anxiety, or depressed mood.     Objective     /82   Pulse 58   Temp 36 °C (96.8 °F) (Temporal)   Resp 18   Ht 1.93 m (6' 4\")   Wt 102 kg (225 lb 12.8 oz)   SpO2 98%   BMI 27.49 kg/m²     No Known Allergies    Constitutional-- Well-nourished.  No distress  Head- unremarkable.  Eyes- PERRL.  Conjunctiva normal.  Nose- Normal.  No rhinorrhea noted.  Throat- Oropharynx is clear and moist.  Neck- Supple with no thyromegaly.  No significant cervical adenopathy noted.  Pulmonary/Chest- Breath sounds normal with normal effort.  No wheezing.  Heart- Regular rate and rhythm.  No murmur.  Abdomen- Soft and non-tender.  No masses noted.  Musculoskeletal- Normal ROM.  No significant joint swelling  Extremities- No edema.   Neurological- Alert.  No noted deficits.  Skin- Warm.  No " rashes.  Psychiatric/Behavioral- Mood and affect normal.  Behavior normal.     Assessment/Plan   1. Atrial fibrillation, unspecified type (Multi)        2. Poisoning by unspecified narcotics, accidental (unintentional), initial encounter (Multi)        3. Chronic obstructive pulmonary disease, unspecified COPD type (Multi)        4. Type 2 diabetes mellitus with ketoacidosis without coma, with long-term current use of insulin  glimepiride (AmaryL) 2 mg tablet    Comprehensive Metabolic Panel    Hemoglobin A1C    Comprehensive Metabolic Panel    Hemoglobin A1C      5. Screening PSA (prostate specific antigen)  Prostate Specific Antigen, Screen    Prostate Specific Antigen, Screen      6. Primary hypertension        7. Cardiomyopathy, unspecified type (Multi)        8. Colon cancer screening  Colonoscopy Screening; Average Risk Patient             Long talk. Treatment options reviewed.    Reviewed most recent lab work with patient. Advised patient to remain up to date on routine maintenance and health screening.  Maintain appointments with specialists as scheduled.  Advised patient to remain up to date on immunizations.     Discussed Diabetes Mellitus. Continue to monitor glucose levels. Educated on diabetes, low-calorie diet and exercise. Recommended eye exam once a year. Educated on diabetic foot care.    Discussed hypertension.     A-fib stable  COPD stable  Discussed importance of natural sources of nutrition.  Advised patient to consume vegetables, salads, fruits, nuts, and proteins such as fish and chicken.  Discussed portion control.      Discussed the importance of routine stretching and exercise.     Continue and take your medications as prescribed.    Health Maintenance issues discussed.    Importance of healthy diet and regular exercise regimen discussed.    We will contact you with any test results ordered. If you do not hear from us, please contact.    Follow-up as instructed or sooner if any problems or  symptoms do not resolve as expected.     Scribe Attestation  By signing my name below, ILibby Scribe   attest that this documentation has been prepared under the direction and in the presence of Malik Elaine MD.

## 2025-03-17 ENCOUNTER — PHARMACY VISIT (OUTPATIENT)
Dept: PHARMACY | Facility: CLINIC | Age: 60
End: 2025-03-17
Payer: MEDICAID

## 2025-03-17 ENCOUNTER — HOSPITAL ENCOUNTER (OUTPATIENT)
Facility: HOSPITAL | Age: 60
Setting detail: OUTPATIENT SURGERY
Discharge: HOME | End: 2025-03-17
Attending: STUDENT IN AN ORGANIZED HEALTH CARE EDUCATION/TRAINING PROGRAM | Admitting: STUDENT IN AN ORGANIZED HEALTH CARE EDUCATION/TRAINING PROGRAM
Payer: COMMERCIAL

## 2025-03-17 VITALS
RESPIRATION RATE: 18 BRPM | DIASTOLIC BLOOD PRESSURE: 76 MMHG | WEIGHT: 225 LBS | TEMPERATURE: 97.9 F | OXYGEN SATURATION: 91 % | HEIGHT: 76 IN | BODY MASS INDEX: 27.4 KG/M2 | HEART RATE: 68 BPM | SYSTOLIC BLOOD PRESSURE: 140 MMHG

## 2025-03-17 DIAGNOSIS — I49.3 PVC (PREMATURE VENTRICULAR CONTRACTION): ICD-10-CM

## 2025-03-17 DIAGNOSIS — Z45.02 ICD (IMPLANTABLE CARDIOVERTER-DEFIBRILLATOR) BATTERY DEPLETION: ICD-10-CM

## 2025-03-17 DIAGNOSIS — M25.562 CHRONIC PAIN OF LEFT KNEE: ICD-10-CM

## 2025-03-17 DIAGNOSIS — Z95.810 BIVENTRICULAR ICD (IMPLANTABLE CARDIOVERTER-DEFIBRILLATOR) IN PLACE: ICD-10-CM

## 2025-03-17 DIAGNOSIS — I42.9 CARDIOMYOPATHY, UNSPECIFIED TYPE (MULTI): ICD-10-CM

## 2025-03-17 DIAGNOSIS — Z95.0 PACEMAKER: Primary | ICD-10-CM

## 2025-03-17 DIAGNOSIS — I10 ESSENTIAL HYPERTENSION: ICD-10-CM

## 2025-03-17 DIAGNOSIS — G89.29 CHRONIC PAIN OF LEFT KNEE: ICD-10-CM

## 2025-03-17 LAB
ANION GAP SERPL CALC-SCNC: 11 MMOL/L (ref 10–20)
BUN SERPL-MCNC: 15 MG/DL (ref 6–23)
CALCIUM SERPL-MCNC: 10.6 MG/DL (ref 8.6–10.3)
CHLORIDE SERPL-SCNC: 101 MMOL/L (ref 98–107)
CO2 SERPL-SCNC: 28 MMOL/L (ref 21–32)
CREAT SERPL-MCNC: 0.76 MG/DL (ref 0.5–1.3)
EGFRCR SERPLBLD CKD-EPI 2021: >90 ML/MIN/1.73M*2
ERYTHROCYTE [DISTWIDTH] IN BLOOD BY AUTOMATED COUNT: 13.6 % (ref 11.5–14.5)
GLUCOSE SERPL-MCNC: 126 MG/DL (ref 74–99)
HCT VFR BLD AUTO: 47.5 % (ref 41–52)
HGB BLD-MCNC: 14.9 G/DL (ref 13.5–17.5)
MCH RBC QN AUTO: 29.6 PG (ref 26–34)
MCHC RBC AUTO-ENTMCNC: 31.4 G/DL (ref 32–36)
MCV RBC AUTO: 94 FL (ref 80–100)
NRBC BLD-RTO: 0 /100 WBCS (ref 0–0)
PLATELET # BLD AUTO: 208 X10*3/UL (ref 150–450)
POTASSIUM SERPL-SCNC: 5.2 MMOL/L (ref 3.5–5.3)
RBC # BLD AUTO: 5.04 X10*6/UL (ref 4.5–5.9)
SODIUM SERPL-SCNC: 135 MMOL/L (ref 136–145)
WBC # BLD AUTO: 8.7 X10*3/UL (ref 4.4–11.3)

## 2025-03-17 PROCEDURE — 2780000003 HC OR 278 NO HCPCS: Performed by: STUDENT IN AN ORGANIZED HEALTH CARE EDUCATION/TRAINING PROGRAM

## 2025-03-17 PROCEDURE — 99152 MOD SED SAME PHYS/QHP 5/>YRS: CPT | Performed by: STUDENT IN AN ORGANIZED HEALTH CARE EDUCATION/TRAINING PROGRAM

## 2025-03-17 PROCEDURE — 7100000009 HC PHASE TWO TIME - INITIAL BASE CHARGE: Performed by: STUDENT IN AN ORGANIZED HEALTH CARE EDUCATION/TRAINING PROGRAM

## 2025-03-17 PROCEDURE — 33264 RMVL & RPLCMT DFB GEN MLT LD: CPT | Performed by: STUDENT IN AN ORGANIZED HEALTH CARE EDUCATION/TRAINING PROGRAM

## 2025-03-17 PROCEDURE — 2720000007 HC OR 272 NO HCPCS: Performed by: STUDENT IN AN ORGANIZED HEALTH CARE EDUCATION/TRAINING PROGRAM

## 2025-03-17 PROCEDURE — 80048 BASIC METABOLIC PNL TOTAL CA: CPT | Performed by: STUDENT IN AN ORGANIZED HEALTH CARE EDUCATION/TRAINING PROGRAM

## 2025-03-17 PROCEDURE — 2750000001 HC OR 275 NO HCPCS: Performed by: STUDENT IN AN ORGANIZED HEALTH CARE EDUCATION/TRAINING PROGRAM

## 2025-03-17 PROCEDURE — 96367 TX/PROPH/DG ADDL SEQ IV INF: CPT | Performed by: STUDENT IN AN ORGANIZED HEALTH CARE EDUCATION/TRAINING PROGRAM

## 2025-03-17 PROCEDURE — C1781 MESH (IMPLANTABLE): HCPCS | Performed by: STUDENT IN AN ORGANIZED HEALTH CARE EDUCATION/TRAINING PROGRAM

## 2025-03-17 PROCEDURE — 99153 MOD SED SAME PHYS/QHP EA: CPT | Performed by: STUDENT IN AN ORGANIZED HEALTH CARE EDUCATION/TRAINING PROGRAM

## 2025-03-17 PROCEDURE — 33223 RELOCATE POCKET FOR DEFIB: CPT | Performed by: STUDENT IN AN ORGANIZED HEALTH CARE EDUCATION/TRAINING PROGRAM

## 2025-03-17 PROCEDURE — 96374 THER/PROPH/DIAG INJ IV PUSH: CPT | Performed by: STUDENT IN AN ORGANIZED HEALTH CARE EDUCATION/TRAINING PROGRAM

## 2025-03-17 PROCEDURE — 7100000010 HC PHASE TWO TIME - EACH INCREMENTAL 1 MINUTE: Performed by: STUDENT IN AN ORGANIZED HEALTH CARE EDUCATION/TRAINING PROGRAM

## 2025-03-17 PROCEDURE — 2500000004 HC RX 250 GENERAL PHARMACY W/ HCPCS (ALT 636 FOR OP/ED): Performed by: STUDENT IN AN ORGANIZED HEALTH CARE EDUCATION/TRAINING PROGRAM

## 2025-03-17 PROCEDURE — RXMED WILLOW AMBULATORY MEDICATION CHARGE

## 2025-03-17 PROCEDURE — 85027 COMPLETE CBC AUTOMATED: CPT | Performed by: STUDENT IN AN ORGANIZED HEALTH CARE EDUCATION/TRAINING PROGRAM

## 2025-03-17 PROCEDURE — C1882 AICD, OTHER THAN SING/DUAL: HCPCS | Performed by: STUDENT IN AN ORGANIZED HEALTH CARE EDUCATION/TRAINING PROGRAM

## 2025-03-17 PROCEDURE — 36415 COLL VENOUS BLD VENIPUNCTURE: CPT | Performed by: STUDENT IN AN ORGANIZED HEALTH CARE EDUCATION/TRAINING PROGRAM

## 2025-03-17 DEVICE — HF CARDIAC RESYNCHRONISATION THERAPY DEFIBRILLATOR VVED DDDRV
Type: IMPLANTABLE DEVICE | Site: CHEST  WALL | Status: FUNCTIONAL
Brand: GALLANT™

## 2025-03-17 RX ORDER — GABAPENTIN 800 MG/1
800 TABLET ORAL
Start: 2025-03-17

## 2025-03-17 RX ORDER — POTASSIUM CHLORIDE 20 MEQ/1
20 TABLET, EXTENDED RELEASE ORAL 2 TIMES DAILY
Start: 2025-03-17

## 2025-03-17 RX ORDER — MIDAZOLAM HYDROCHLORIDE 1 MG/ML
INJECTION, SOLUTION INTRAMUSCULAR; INTRAVENOUS AS NEEDED
Status: DISCONTINUED | OUTPATIENT
Start: 2025-03-17 | End: 2025-03-17 | Stop reason: HOSPADM

## 2025-03-17 RX ORDER — LIDOCAINE HYDROCHLORIDE AND EPINEPHRINE 10; 20 UG/ML; MG/ML
INJECTION, SOLUTION INFILTRATION; PERINEURAL AS NEEDED
Status: DISCONTINUED | OUTPATIENT
Start: 2025-03-17 | End: 2025-03-17 | Stop reason: HOSPADM

## 2025-03-17 RX ORDER — CEFADROXIL 500 MG/1
500 CAPSULE ORAL 2 TIMES DAILY
Qty: 14 CAPSULE | Refills: 0 | Status: SHIPPED | OUTPATIENT
Start: 2025-03-17 | End: 2025-03-24

## 2025-03-17 RX ORDER — FENTANYL CITRATE 50 UG/ML
INJECTION, SOLUTION INTRAMUSCULAR; INTRAVENOUS AS NEEDED
Status: DISCONTINUED | OUTPATIENT
Start: 2025-03-17 | End: 2025-03-17 | Stop reason: HOSPADM

## 2025-03-17 RX ORDER — CEFAZOLIN SODIUM 2 G/100ML
2 INJECTION, SOLUTION INTRAVENOUS ONCE
Status: DISCONTINUED | OUTPATIENT
Start: 2025-03-17 | End: 2025-03-17 | Stop reason: HOSPADM

## 2025-03-17 RX ORDER — CEFAZOLIN SODIUM 2 G/100ML
INJECTION, SOLUTION INTRAVENOUS CONTINUOUS PRN
Status: DISCONTINUED | OUTPATIENT
Start: 2025-03-17 | End: 2025-03-17 | Stop reason: HOSPADM

## 2025-03-17 ASSESSMENT — ENCOUNTER SYMPTOMS
CHEST TIGHTNESS: 0
COUGH: 0
SHORTNESS OF BREATH: 0

## 2025-03-17 ASSESSMENT — PAIN - FUNCTIONAL ASSESSMENT: PAIN_FUNCTIONAL_ASSESSMENT: 0-10

## 2025-03-17 ASSESSMENT — PAIN SCALES - GENERAL: PAINLEVEL_OUTOF10: 0 - NO PAIN

## 2025-03-17 NOTE — Clinical Note
The DEFIBRILLATOR, CRT-D, [facility] - [id number] - [id number] device was inserted. The leads were placed into the connector and visually verified to be in correct position.

## 2025-03-17 NOTE — H&P
History Of Present Illness  Armaan De Jesus is a 60 y.o. male presenting with NICM (LVEF 20%) s/p ICD, CAD, HTN, HLD, T2DM, COPD, Tobacco use presenting today for a battery that reached JUSTIN for his biventricular ICD on 1/11/2025.  Patient presenting today for elective battery replacement     Past Medical History  Past Medical History:   Diagnosis Date    Cardiomyopathy, unspecified 07/21/2022    Cardiomyopathy    Encounter for adjustment and management of automatic implantable cardiac defibrillator 09/18/2020    ICD (implantable cardioverter-defibrillator) battery depletion    Encounter for other preprocedural examination 08/17/2021    Pre-op exam    Encounter for other preprocedural examination 09/27/2021    Pre-op examination    Encounter for other preprocedural examination 07/21/2021    Preoperative clearance    Encounter for screening for malignant neoplasm of colon 05/04/2021    Screen for colon cancer    Encounter for screening for malignant neoplasm of prostate 11/02/2021    Screening PSA (prostate specific antigen)    Encounter for screening for malignant neoplasm of prostate 05/04/2021    Screening PSA (prostate specific antigen)    Other fecal abnormalities 06/26/2021    Positive FIT (fecal immunochemical test)    Other symptoms and signs involving the nervous system 01/09/2018    Suspected sleep apnea    Personal history of other diseases of the circulatory system     History of coronary atherosclerosis    Personal history of other diseases of the digestive system 06/30/2021    History of constipation    Right lower quadrant pain 06/03/2021    Right inguinal pain    Tobacco abuse counseling 03/12/2019    Encounter for smoking cessation counseling       Surgical History  Past Surgical History:   Procedure Laterality Date    OTHER SURGICAL HISTORY  08/18/2021    Inguinal hernia repair    OTHER SURGICAL HISTORY  07/30/2021    Colonoscopy    OTHER SURGICAL HISTORY  07/14/2021    Permanent pacemaker insertion     OTHER SURGICAL HISTORY  07/14/2021    Cardioverter defibrillator insertion    OTHER SURGICAL HISTORY  07/14/2021    Finger surgical procedure        Social History  He reports that he has been smoking cigarettes. He has never used smokeless tobacco. He reports that he does not currently use alcohol. He reports current drug use. Frequency: 7.00 times per week. Drug: Marijuana.    Family History  Family History   Problem Relation Name Age of Onset    Hyperlipidemia Mother      Hyperlipidemia Father          Allergies  Patient has no known allergies.    Review of Systems   Respiratory:  Negative for cough, chest tightness and shortness of breath.    All other systems reviewed and are negative.       Physical Exam  Constitutional:       Appearance: Normal appearance.   HENT:      Head: Normocephalic.   Cardiovascular:      Rate and Rhythm: Normal rate and regular rhythm.      Pulses: Normal pulses.      Heart sounds: No murmur heard.     Comments: left sided implant healed well, no ecchymosis, hematoma or drainage noted  Pulmonary:      Effort: Pulmonary effort is normal. No respiratory distress.   Musculoskeletal:         General: No swelling.   Skin:     General: Skin is warm and dry.   Neurological:      Mental Status: He is alert.   Psychiatric:         Mood and Affect: Mood normal.        Assessment/Plan   Assessment & Plan  ICD (implantable cardioverter-defibrillator) battery depletion    Biventricular ICD (implantable cardioverter-defibrillator) in place    PVC (premature ventricular contraction)      Battery reached JUSTIN on 1/11/2025.  At this point patient needs semiurgent ICD replacement.  We talked about the risk and benefits of the procedure and patient is agreeable.  Furthermore since patient is relatively young the requirement of ICD is reasonable for him given his cardiomyopathy.       I spent 26 minutes in the professional and overall care of this patient.      Ziyad Rodriguez MD

## 2025-03-17 NOTE — DISCHARGE INSTRUCTIONS
Do not lift left arm above shoulder for 4-5 weeks  -no repetitive motion or lifting heavy weight for 4-5 weeks.  No driving,alcohol or making legal decision for 24 hours  -keep wound complete dry for 7 days.may shower but keep bandage dry  -no soaking in a hot tubs or baths for 10 days ,may sponge bath  Keep the bandage on the incision until see in the office a 1 week  Allow sterid trips underneath to fall off naturally  Call office  if any discharge around incision or fever  Pacemaker site check in 1 week ,device clinic check in 1 month.follow up Dr Michael flores 3 months

## 2025-03-17 NOTE — POST-PROCEDURE NOTE
Physician Transition of Care Summary  Cardiac Electrophysiology Lab/OR    Procedure Date: 3/17/2025  Attending:    Delta Rodriguez - Primary    Resident/Fellow/Other Assistant: Surgeons and Role:  * No surgeons found with a matching role *    Indications:   Pre-op Diagnosis      * ICD (implantable cardioverter-defibrillator) battery depletion [Z45.02]     * PVC (premature ventricular contraction) [I49.3]     * Biventricular ICD (implantable cardioverter-defibrillator) in place [Z95.810]    Post-procedure diagnosis:   Post-op Diagnosis     * ICD (implantable cardioverter-defibrillator) battery depletion [Z45.02]     * PVC (premature ventricular contraction) [I49.3]     * Biventricular ICD (implantable cardioverter-defibrillator) in place [Z95.810]    Procedure(s):   CRT-D Gen Change  86739 - [place] INSJ ELTRD CAR SARAH SYS ATTCH PREV PM/DFB PLS GEN      Summary:  Successful replacement of a Abbott/SJM left sided Biventricular ICD  ICD pocket revision    Recommendations:  Tentatively patient will be discharged Today, following bed rest and subsequent ambulation, provided the recovery parameters are appropriate.   The patient should continue with the present medications.   Discharge home with PO Abx Cefadroxil 500mg BID x7 days    Patient Instructions:  Please do not lift left arm above shoulder level for 1-2 weeks  No repetitive motion or lifting heavy weight for 1-2 weeks  No driving, alcohol or making legal decisions for 24 hours.  Keep wound completely dry for 7 days; may shower but keep bandage as dry as possible.  No soaking in hot tubs or baths for 10 days. May sponge bath  Keep bandage on incision until seen in the office in 1 week.  Allow steristrips underneath to fall off naturally.    Please call our office ([place]:[phone]) if you notice any discharge or swelling around incision or fever.    Follow up:   The patient should be alert for bleeding, swelling, or signs of infection. The patient should call the  "electrophysiologist immediately if symptoms recur, or for any problems. The patient and family (with HIPPA consent)  have been instructed accordingly.   Follow up in Clinic in 1 weeks for wound check.     For full procedure note/study review please go to \"Chart review\" --> \"Cardiology\" tab --> Electrophysiology procedure for 3/17/2025    Complications:   None    Stents/Implants:   Implants       Pacemaker    Defibrillator, Crt-D, [employer] - [id number] - [id number] - Implanted        Inventory item: DEFIBRILLATOR, CRT-D, [employer] Model/Cat number: [id number]    Serial number: [id number] : [employer]    Lot number: [id number] Device identifier: [id number]    Implant Date: 3/17/2025        GUDID Information       Request status Successful        Brand name: [employer]™ Version/Model: [id number]    Company name: [employer] MRI safety info as of 3/17/25: MR Conditional    Contains dry or latex rubber: No      GMDN P.T. name: Cardiac resynchronization therapy implantable defibrillator                As of 3/17/2025       Status: Implanted                              Anticoagulation/Antiplatelet Plan:   N/A    Estimated Blood Loss:   * No values recorded between 5/31/2024  2:17 PM and 5/31/2024  7:50 PM *    Anesthesia: Moderate Sedation Anesthesia Staff: No anesthesia staff entered.    Any Specimen(s) Removed:   Order Name Source Comment Collection Info Order Time   CBC Blood, Venous  Collected By: Faith Etienne RN 3/17/2025 12:16 PM     [email or web address]   Immediate        BASIC METABOLIC PANEL Blood, Venous  Collected By: Faith Etienne RN 3/17/2025 12:16 PM     Release result to MyChart   Immediate            Disposition:   Monitor for 1-1.5hrs post anesthesia and bed rest, and plan for Discharge home on 3/17/24    Ziyad Rodriguez MD [facility]  Cardiac Electrophysiology    **Disclaimer: This note was dictated by speech recognition, and every effort has been made to prevent any error " in transcription, however minor errors may be present**

## 2025-03-19 ENCOUNTER — APPOINTMENT (OUTPATIENT)
Dept: CARDIOLOGY | Facility: HOSPITAL | Age: 60
End: 2025-03-19
Payer: COMMERCIAL

## 2025-03-19 ENCOUNTER — APPOINTMENT (OUTPATIENT)
Dept: CARDIOLOGY | Facility: CLINIC | Age: 60
End: 2025-03-19
Payer: COMMERCIAL

## 2025-03-24 DIAGNOSIS — Z95.810 BIVENTRICULAR ICD (IMPLANTABLE CARDIOVERTER-DEFIBRILLATOR) IN PLACE: ICD-10-CM

## 2025-03-24 DIAGNOSIS — I42.0 DILATED CARDIOMYOPATHY (MULTI): ICD-10-CM

## 2025-03-24 DIAGNOSIS — I47.20 PAROXYSMAL VENTRICULAR TACHYCARDIA: ICD-10-CM

## 2025-03-26 ENCOUNTER — APPOINTMENT (OUTPATIENT)
Dept: PHARMACY | Facility: HOSPITAL | Age: 60
End: 2025-03-26
Payer: COMMERCIAL

## 2025-03-26 DIAGNOSIS — J44.9 CHRONIC OBSTRUCTIVE PULMONARY DISEASE, UNSPECIFIED COPD TYPE (MULTI): ICD-10-CM

## 2025-03-26 RX ORDER — ALBUTEROL SULFATE 90 UG/1
2 INHALANT RESPIRATORY (INHALATION) EVERY 6 HOURS PRN
Qty: 8 G | Refills: 11 | Status: SHIPPED | OUTPATIENT
Start: 2025-03-26

## 2025-03-26 NOTE — ASSESSMENT & PLAN NOTE
ASSESSMENT:  Patient with COPD that is stable and reasonably well controlled. Based on CAT score, exacerbation history, and eosinophil count, patient is GOLD Group B and LAMA/LABA therapy is recommended.   Started using Anoro daily. No issues reported. Albuterol only on exertion.    PLAN:  Medication Changes:  CONTINUE:  Albuterol HFA PRN  Anoro Ellipta 1 puff daily    Monitoring and Education:  Anoro Ellipta Education:  Counseled patient on Anoro MOA, expectations, side effects, duration of therapy, administration, and monitoring parameters.  Open cover of inhaler, exhale away from the mouthpiece, place lips around the mouthpiece, inhale slowly and deeply, exhale again away from the inhaler  If using Breo, Arnuity or Trelegy, must rinse mouth with water after use  Side effects include: nose/throat irritation, oral candidiasis, anxiety, high heart rate, dry mouth and/or bitter taste  All questions and concerns addressed.

## 2025-03-26 NOTE — PROGRESS NOTES
Patient ID: Armaan De Jesus is a 60 y.o. male who presents for COPD.  Pt is here for Follow Up appointment.     PCP/Referring Provider: Malik Elaine MD  Last Visit: 3.14.25    Verbal consent to manage patient's drug therapy was obtained from patient. They were informed they may decline to participate or withdraw from participation in pharmacy services at any time.      Subjective   Treatment Adherence:   Patient did take medications today.   Number of missed doses in last 7 days: 0.       Preferred pharmacy: [facility]  Can patient afford prescribed medications: Yes, no issues reported    Interval History:  Had ICD/pacemaker battery replaced      HPI  PULMONARY ASSESSMENT  Patient has been diagnosed with: COPD  does not see a pulmonologist  Last visit: 9/2020 at [facility]  has not had PFT's completed in last 2 years - last in 2020    Current Regimen:  Albuterol PRN  Anoro Ellipta 1 puff daily    Historical Treatment:  Bevespi - unknown reason    Symptom Management:  Current symptoms: dyspnea  Triggers: exertion  Alleviating factors: takes albuterol, rest    Exacerbation Hx:  When was your last hospitalization for an exacerbation? None found  When was the last time you were treated with antibiotics and/or steroids? 2022     Rescue Inhaler Use:  How often do you use your rescue inhaler? 2 times per day if he is out    Appropriate Inhaler Technique: yes      Vaccines:  Influenza: Date [2018]  PPSV23: Date [2024]  COVID: Date [2021]      Review of Systems    Objective     BP Readings from Last 4 Encounters:   03/17/25 140/76   03/14/25 130/82   01/24/25 82/52   12/27/24 118/60      There were no vitals filed for this visit.     Labs  Creatinine   Date Value Ref Range Status   03/17/2025 0.76 0.50 - 1.30 mg/dL Final     eGFR   Date Value Ref Range Status   03/17/2025 >90 >60 mL/min/1.73m*2 Final     Comment:     Calculations of estimated GFR are performed using the 2021 CKD-EPI Study Refit equation without the race variable for  the IDMS-Traceable creatinine methods.  https://jasn.asnjournals.org/content/early/2021/09/22/ASN.1684097516     Sodium   Date Value Ref Range Status   03/17/2025 135 (L) 136 - 145 mmol/L Final     Potassium   Date Value Ref Range Status   03/17/2025 5.2 3.5 - 5.3 mmol/L Final     Chloride   Date Value Ref Range Status   03/17/2025 101 98 - 107 mmol/L Final     Urea Nitrogen   Date Value Ref Range Status   03/17/2025 15 6 - 23 mg/dL Final     AST   Date Value Ref Range Status   12/15/2024 11 9 - 39 U/L Final        Lab Results   Component Value Date    BILITOT 0.5 12/15/2024    CALCIUM 10.6 (H) 03/17/2025    CO2 28 03/17/2025     03/17/2025    CREATININE 0.76 03/17/2025    GLUCOSE 126 (H) 03/17/2025    ALKPHOS 59 12/15/2024    K 5.2 03/17/2025    PROT 6.6 12/15/2024     (L) 03/17/2025    AST 11 12/15/2024    ALT 11 12/15/2024    BUN 15 03/17/2025    ANIONGAP 11 03/17/2025    MG 1.62 02/23/2024    ALBUMIN 4.1 12/15/2024    LIPASE 104 (H) 03/20/2020    GFRMALE 88 09/12/2023     Lab Results   Component Value Date    TRIG 199 (H) 09/12/2023    CHOL 142 09/12/2023    HDL 48.8 09/12/2023     Lab Results   Component Value Date    HGBA1C 8.2 07/11/2024       Current Outpatient Medications   Medication Instructions    albuterol 90 mcg/actuation inhaler 2 puffs, inhalation, Every 6 hours PRN    atorvastatin (LIPITOR) 40 mg, oral, Daily    blood sugar diagnostic (FreeStyle Test) strip Use as directed.    blood-glucose meter (OneTouch Ultra2 Meter) misc TEST BID    carvedilol (COREG) 25 mg, oral, 2 times daily (morning and late afternoon)    cyclobenzaprine (FLEXERIL) 10 mg, oral, Nightly    digoxin (LANOXIN) 125 mcg, oral, Daily    empagliflozin (JARDIANCE) 10 mg, oral, Daily    gabapentin (NEURONTIN) 800 mg, oral, Daily RT    glimepiride (AMARYL) 2 mg, oral, Daily before breakfast    lancets misc Use as directed    lisinopril 40 mg, oral, Daily    metFORMIN (GLUCOPHAGE) 500 mg, oral, Daily with breakfast     OneTouch Delica Plus Lancet 33 gauge misc use as directed    OneTouch Ultra Test strip Use as directed    potassium chloride CR (Klor-Con M20) 20 mEq ER tablet 20 mEq, oral, 2 times daily, Do not crush or chew.    spironolactone (ALDACTONE) 25 mg, oral, Daily    umeclidinium-vilanteroL (Anoro Ellipta) 62.5-25 mcg/actuation blister with device 1 puff, inhalation, Daily        DRUG INTERACTIONS:  Digoxin, atorvastatin and spironolactone: Concurrent use may result in elevated levels of and toxicity from digoxin. Symptoms of digoxin toxicity can include anorexia, nausea, vomiting, headache, fatigue, malaise, drowsiness, generalized muscle weakness, disorientation, hallucinations, visual disturbances, and arrhythmias. Ensure patient Is aware of monitoring parameters     Assessment/Plan   Problem List Items Addressed This Visit             ICD-10-CM    COPD (chronic obstructive pulmonary disease) (Multi) J44.9     ASSESSMENT:  Patient with COPD that is stable and reasonably well controlled. Based on CAT score, exacerbation history, and eosinophil count, patient is GOLD Group B and LAMA/LABA therapy is recommended.   Started using Anoro daily. No issues reported. Albuterol only on exertion.    PLAN:  Medication Changes:  CONTINUE:  Albuterol HFA PRN  Anoro Ellipta 1 puff daily    Monitoring and Education:  Anoro Ellipta Education:  Counseled patient on Anoro MOA, expectations, side effects, duration of therapy, administration, and monitoring parameters.  Open cover of inhaler, exhale away from the mouthpiece, place lips around the mouthpiece, inhale slowly and deeply, exhale again away from the inhaler  If using Breo, Arnuity or Trelegy, must rinse mouth with water after use  Side effects include: nose/throat irritation, oral candidiasis, anxiety, high heart rate, dry mouth and/or bitter taste  All questions and concerns addressed.         Relevant Medications    albuterol 90 mcg/actuation inhaler    Other Relevant Orders     Referral to Clinical Pharmacy     Immunizations needed: PCV, RSV  Labs ordered:  none  Referrals:  none     Follow-up: 3 months     Patient was provided with PharmD phone number and encouraged to reach out with any questions or concerns Prior to next appointment or ask provider for another pharmacy referral.    Time spent with pt: Total length of time 10 (minutes) of the encounter and more than 50% was spent counseling the patient.    Thank you for allowing to take part in the care of this patient.    Bruna Dykes PharmD, TERRENCE  Clinical Pharmacist  [phone]  [email or web address]    Continue all meds under the continuation of care with the referring provider and clinical pharmacy team.    Verbal consent to manage patient's drug therapy was obtained from the patient. They were informed they may decline to participate or withdraw from participation in pharmacy services at any time.

## 2025-04-11 ENCOUNTER — HOSPITAL ENCOUNTER (OUTPATIENT)
Dept: CARDIOLOGY | Facility: HOSPITAL | Age: 60
Discharge: HOME | End: 2025-04-11
Payer: COMMERCIAL

## 2025-04-11 DIAGNOSIS — I42.0 DILATED CARDIOMYOPATHY (MULTI): ICD-10-CM

## 2025-04-11 LAB
AORTIC VALVE PEAK VELOCITY: 1.65 M/S
AV PEAK GRADIENT: 11 MMHG
EJECTION FRACTION APICAL 4 CHAMBER: 39
EJECTION FRACTION: 38 %
LEFT VENTRICLE INTERNAL DIMENSION DIASTOLE: 6.3 CM (ref 3.5–6)
LV EJECTION FRACTION BIPLANE: 40 %
MITRAL VALVE E/A RATIO: 0.82
RIGHT VENTRICLE FREE WALL PEAK S': 12.4 CM/S
TRICUSPID ANNULAR PLANE SYSTOLIC EXCURSION: 2.1 CM

## 2025-04-11 PROCEDURE — 93306 TTE W/DOPPLER COMPLETE: CPT | Performed by: INTERNAL MEDICINE

## 2025-04-11 PROCEDURE — 93306 TTE W/DOPPLER COMPLETE: CPT

## 2025-04-12 DIAGNOSIS — E11.9 TYPE 2 DIABETES MELLITUS WITHOUT COMPLICATION, WITHOUT LONG-TERM CURRENT USE OF INSULIN: ICD-10-CM

## 2025-04-14 RX ORDER — METFORMIN HYDROCHLORIDE 500 MG/1
500 TABLET ORAL
Qty: 30 TABLET | Refills: 5 | Status: SHIPPED | OUTPATIENT
Start: 2025-04-14

## 2025-04-18 ENCOUNTER — APPOINTMENT (OUTPATIENT)
Dept: CARDIOLOGY | Facility: CLINIC | Age: 60
End: 2025-04-18
Payer: COMMERCIAL

## 2025-04-18 DIAGNOSIS — I50.22 CHRONIC SYSTOLIC CONGESTIVE HEART FAILURE: Primary | ICD-10-CM

## 2025-04-18 DIAGNOSIS — I42.0 DILATED CARDIOMYOPATHY (MULTI): ICD-10-CM

## 2025-04-18 DIAGNOSIS — I25.10 ARTERIOSCLEROSIS OF CORONARY ARTERY: ICD-10-CM

## 2025-04-18 DIAGNOSIS — I10 ESSENTIAL HYPERTENSION: ICD-10-CM

## 2025-04-18 DIAGNOSIS — E78.00 HYPERCHOLESTEROLEMIA: ICD-10-CM

## 2025-04-18 PROBLEM — Z45.02 ICD (IMPLANTABLE CARDIOVERTER-DEFIBRILLATOR) BATTERY DEPLETION: Status: RESOLVED | Noted: 2025-03-06 | Resolved: 2025-04-18

## 2025-04-18 PROBLEM — R06.09 DYSPNEA ON EXERTION: Status: RESOLVED | Noted: 2023-07-20 | Resolved: 2025-04-18

## 2025-04-18 PROBLEM — I48.91 ATRIAL FIBRILLATION, UNSPECIFIED TYPE (MULTI): Status: RESOLVED | Noted: 2025-03-14 | Resolved: 2025-04-18

## 2025-04-18 PROCEDURE — RXMED WILLOW AMBULATORY MEDICATION CHARGE

## 2025-04-18 NOTE — PROGRESS NOTES
Name : Armaan De Jesus   : 1965   MRN : [id number]   ENC Date : 2025    CC: follow up diagnostics/HF management     HPI:    Armaan De Jesus is a 60 y.o. male with PMHx sig for NIDCM, Chronic Systolic Heart Failure, s/p ICD, mild CAD ([facility] 2016), HTN, HLD, DM II, COPD & Active Smoker 1/2PPD who presents today as above.    Interval Hx:  ED 12/15/24: Reports he was having intercourse, started to have chest pain. Chest pain persisted more than 30 mins so he called EMS. Taken to [facility]. CP resolved with nitroglycerin. Only 1 troponin drawn = 28, assuming that only 1 was drawn as Armaan left AMA. Dx: NSTEMI type I.    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 Reports activity is limited by his legs. Knee replacement last year & feels like he is losing leg muscles. Sleeps on 1 pillow.    OV 25: He is here today on follow up stress test, not for medication refills; however, testing is not done. He no showed the Lexiscan.    VV 25: Lexiscan done, No ischemia but EF reportedly 18%. Repeat echo shows EF 35-40%      CV Diagnostics:  Echo 25:  1. The left ventricular systolic function is moderately decreased, with a visually estimated ejection fraction of 35-40%.   2. There is global hypokinesis of the left ventricle with minor regional variations.   3. Spectral Doppler shows a Grade I (impaired relaxation pattern) of left ventricular diastolic filling with normal left atrial filling pressure.   4. There is severe concentric left ventricular hypertrophy.   5. There is moderately increased posterior left ventricular wall thickness.    Lexiscan 3/3/25:  1. No evidence for stress-induced myocardial ischemia or prior  infarction.  2. The left ventricle is severely dilated.  3. Global hypokinesis with reduced LV EF estimated at 18%.    Echo 24:   1. Left ventricular systolic function is moderately to severely decreased with a 30-35% estimated ejection  fraction.   2. Poorly visualized anatomical structures due to suboptimal image quality.   3. Increased LV mass.   4. Spectral Doppler shows an impaired relaxation pattern of left ventricular diastolic filling.   5. There is moderate eccentric left ventricular hypertrophy.   6. The estimated RVSP is 17 mm.   7. Left ventricular cavity size is moderate to severely dilated.   8. There is global hypokinesis of the left ventricle with minor regional variations.    ROS: unless otherwise noted in the history of present illness, all other systems were reviewed and they are negative for complaints     Allergies:  Patient has no known allergies.    Current Outpatient Medications   Medication Instructions    albuterol 90 mcg/actuation inhaler 2 puffs, inhalation, Every 6 hours PRN    atorvastatin (LIPITOR) 40 mg, oral, Daily    blood sugar diagnostic (FreeStyle Test) strip Use as directed.    blood-glucose meter (OneTouch Ultra2 Meter) misc TEST BID    carvedilol (COREG) 25 mg, oral, 2 times daily (morning and late afternoon)    cyclobenzaprine (FLEXERIL) 10 mg, oral, Nightly    digoxin (LANOXIN) 125 mcg, oral, Daily    empagliflozin (JARDIANCE) 10 mg, oral, Daily    gabapentin (NEURONTIN) 800 mg, oral, Daily RT    glimepiride (AMARYL) 2 mg, oral, Daily before breakfast    lancets misc Use as directed    lisinopril 40 mg, oral, Daily    metFORMIN (GLUCOPHAGE) 500 mg, oral, Daily with breakfast    OneTouch Delica Plus Lancet 33 gauge misc use as directed    OneTouch Ultra Test strip Use as directed    potassium chloride CR (Klor-Con M20) 20 mEq ER tablet 20 mEq, oral, 2 times daily, Do not crush or chew.    spironolactone (ALDACTONE) 25 mg, oral, Daily    umeclidinium-vilanteroL (Anoro Ellipta) 62.5-25 mcg/actuation blister with device 1 puff, inhalation, Daily        Last Labs:  CBC  Lab Results   Component Value Date    WBC 8.7 03/17/2025    HGB 14.9 03/17/2025    HCT 47.5 03/17/2025    MCV 94 03/17/2025     03/17/2025        CMP  Lab Results   Component Value Date    CALCIUM 10.6 (H) 03/17/2025    PROT 6.6 12/15/2024    ALBUMIN 4.1 12/15/2024    AST 11 12/15/2024    ALT 11 12/15/2024    ALKPHOS 59 12/15/2024    BILITOT 0.5 12/15/2024       BMP   Lab Results   Component Value Date     (L) 03/17/2025    K 5.2 03/17/2025     03/17/2025    CO2 28 03/17/2025    GLUCOSE 126 (H) 03/17/2025    BUN 15 03/17/2025    CREATININE 0.76 03/17/2025       LIPID PANEL   Lab Results   Component Value Date    CHOL 142 09/12/2023    TRIG 199 (H) 09/12/2023    HDL 48.8 09/12/2023    CHHDL 2.9 09/12/2023    LDLF 53 09/12/2023    VLDL 40 09/12/2023    NHDL 91 05/04/2021       RENAL FUNCTION PANEL   Lab Results   Component Value Date    GLUCOSE 126 (H) 03/17/2025     (L) 03/17/2025    K 5.2 03/17/2025     03/17/2025    CO2 28 03/17/2025    ANIONGAP 11 03/17/2025    BUN 15 03/17/2025    CREATININE 0.76 03/17/2025    GFRMALE 88 09/12/2023    CALCIUM 10.6 (H) 03/17/2025    ALBUMIN 4.1 12/15/2024        Lab Results   Component Value Date     (H) 12/15/2024    HGBA1C 8.2 07/11/2024     I have reviewed the above labs & diagnostics    Last Recorded Vitals:  There were no vitals filed for this visit.    Physical Exam:  A+O X3, NAD    Assessment/Plan:  Nonischemic Dilated Cardiomyopathy. HFrEF (20%-->30-35%). Stage C. NYHA Class I-II.  Most recent   - he was unable to complete cMRI 2/2 claustrophobia   - c/w Coreg 25mg BID  - c/w Lisinopril 40mg every day --> will try to transition him to Entresto. BP elevated. Allow for 36hr washout. Educated Armaan on this.  - c/w Spironolactone 25mg every day  - c/w Jardiance 10 mg every day   - c/w Digoxin 0.125mg every day, dig level 0.49 on 2/23/24. Re-check.    2. ICD in situ. Established with Dr. Rodriguez    3. mild CAD on [facility] 2016. Recent lexiscan without ischemia. No recurrence of his chest pain.  - Continue Atorvastatin 40mg every day.   - c/w ASA     4. Hypertension. Plan as  above.    5. DLD. LDL at goal < 70 (53). C/w atorvastatin.     6. Tobacco Abuse. Educated on the risks of smoking & reviewed benefits of cessation  - Discussed need for complete cessation    7. COPD. Following with PCP    Follow up with Dr. Gusman in 1 month    Tracy M Schwab, APRRENETTA-CNP

## 2025-04-24 ENCOUNTER — HOSPITAL ENCOUNTER (OUTPATIENT)
Dept: CARDIOLOGY | Facility: CLINIC | Age: 60
Discharge: HOME | End: 2025-04-24
Payer: COMMERCIAL

## 2025-04-24 DIAGNOSIS — I47.20 PAROXYSMAL VENTRICULAR TACHYCARDIA: ICD-10-CM

## 2025-04-24 DIAGNOSIS — I42.0 DILATED CARDIOMYOPATHY (MULTI): ICD-10-CM

## 2025-04-24 DIAGNOSIS — Z95.810 BIVENTRICULAR ICD (IMPLANTABLE CARDIOVERTER-DEFIBRILLATOR) IN PLACE: ICD-10-CM

## 2025-05-01 DIAGNOSIS — G89.29 CHRONIC PAIN OF LEFT KNEE: ICD-10-CM

## 2025-05-01 DIAGNOSIS — M25.562 CHRONIC PAIN OF LEFT KNEE: ICD-10-CM

## 2025-05-01 RX ORDER — CYCLOBENZAPRINE HCL 10 MG
10 TABLET ORAL NIGHTLY
Qty: 30 TABLET | Refills: 5 | Status: SHIPPED | OUTPATIENT
Start: 2025-05-01

## 2025-05-09 ENCOUNTER — PHARMACY VISIT (OUTPATIENT)
Dept: PHARMACY | Facility: CLINIC | Age: 60
End: 2025-05-09
Payer: MEDICAID

## 2025-05-15 DIAGNOSIS — I42.9 CARDIOMYOPATHY, UNSPECIFIED TYPE (MULTI): ICD-10-CM

## 2025-05-15 DIAGNOSIS — I10 ESSENTIAL HYPERTENSION: ICD-10-CM

## 2025-05-15 RX ORDER — POTASSIUM CHLORIDE 1500 MG/1
TABLET, EXTENDED RELEASE ORAL
Qty: 180 TABLET | Refills: 1 | Status: SHIPPED | OUTPATIENT
Start: 2025-05-15

## 2025-05-16 DIAGNOSIS — E11.9 TYPE 2 DIABETES MELLITUS WITHOUT COMPLICATION, WITHOUT LONG-TERM CURRENT USE OF INSULIN: ICD-10-CM

## 2025-05-16 RX ORDER — BLOOD SUGAR DIAGNOSTIC
STRIP MISCELLANEOUS
Qty: 100 STRIP | Refills: 1 | Status: SHIPPED | OUTPATIENT
Start: 2025-05-16

## 2025-05-19 ENCOUNTER — APPOINTMENT (OUTPATIENT)
Dept: PHARMACY | Facility: HOSPITAL | Age: 60
End: 2025-05-19
Payer: COMMERCIAL

## 2025-05-19 DIAGNOSIS — I50.22 CHRONIC SYSTOLIC CONGESTIVE HEART FAILURE: ICD-10-CM

## 2025-05-19 NOTE — PROGRESS NOTES
"  Pharmacist Clinic: Cardiology Management    Raymond De Jesus is a 60 y.o. male was referred to Clinical Pharmacy Team for hf management.     Referring Provider: Schwab, Tracy M, APRN-C*    THIS IS A NEW PATIENT APPOINTMENT. PATIENT WILL BE ESTABLISHING CARE WITH CLINICAL PHARMACY.    Appointment was completed by raymond who was reached at .    REVIEW OF PAST APPNT (IF APPLICABLE):   N/a    Allergies Reviewed? No    Allergies[1]    Medical History[2]    Medications Ordered Prior to Encounter[3]      RELEVANT LAB RESULTS:  Lab Results   Component Value Date    BILITOT 0.5 12/15/2024    CALCIUM 10.6 (H) 03/17/2025    CO2 28 03/17/2025     03/17/2025    CREATININE 0.76 03/17/2025    GLUCOSE 126 (H) 03/17/2025    ALKPHOS 59 12/15/2024    K 5.2 03/17/2025    PROT 6.6 12/15/2024     (L) 03/17/2025    AST 11 12/15/2024    ALT 11 12/15/2024    BUN 15 03/17/2025    ANIONGAP 11 03/17/2025    MG 1.62 02/23/2024    ALBUMIN 4.1 12/15/2024    LIPASE 104 (H) 03/20/2020    GFRMALE 88 09/12/2023     Lab Results   Component Value Date    TRIG 199 (H) 09/12/2023    CHOL 142 09/12/2023    HDL 48.8 09/12/2023     No results found for: \"BMCBC\", \"CBCDIF\"     PHARMACEUTICAL ASSESSMENT:    MEDICATION RECONCILIATION    Was a medication reconciliation completed at this visit? Yes  Home Pharmacy Reviewed? No        Drug Interactions? No    Medication Documentation Review Audit       Reviewed by Tracy M Schwab, APRN-CNP (Nurse Practitioner) on 04/18/25 at 1109      Medication Order Taking? Sig Documenting Provider Last Dose Status   albuterol 90 mcg/actuation inhaler [id number]  Inhale 2 puffs every 6 hours if needed for wheezing. Malik Elaine MD  Active   atorvastatin (Lipitor) 40 mg tablet [id number] No Take 1 tablet (40 mg) by mouth once daily. Tracy M Schwab, APRN-CNP 3/17/2025 Active   blood sugar diagnostic (FreeStyle Test) strip [id number] No Use as directed. Malik Elaine MD Taking Active   blood-glucose meter (OneTouch " Ultra2 Meter) misc [id number] No TEST BID Malik Elaine MD Taking Active   carvedilol (Coreg) 25 mg tablet [id number] No Take 1 tablet (25 mg) by mouth 2 times daily (morning and late afternoon). Tracy M Schwab, APRN-CNP 3/17/2025 Active   cyclobenzaprine (Flexeril) 10 mg tablet [id number]  Take 1 tablet (10 mg) by mouth once daily at bedtime. Malik Elaine MD  Active   digoxin (Lanoxin) 125 MCG tablet [id number] No Take 1 tablet (125 mcg) by mouth once daily. Tracy M Schwab, APRN-CNP 3/17/2025 Active   empagliflozin (Jardiance) 10 mg [id number] No Take 1 tablet (10 mg) by mouth once daily. Tracy M Schwab, APRN-CNP 3/17/2025 Active   gabapentin (Neurontin) 800 mg tablet [id number]  Take 1 tablet (800 mg) by mouth once daily. OSEAS Reid  Active   glimepiride (AmaryL) 2 mg tablet [id number]  Take 1 tablet (2 mg) by mouth once daily in the morning. Take before meals. Malik Elaine MD  Active   lancets misc [id number]  Use as directed Malik Elaine MD  Active   lisinopril 40 mg tablet [id number] No Take 1 tablet (40 mg) by mouth once daily. Tracy M Schwab, APRN-CNP 3/17/2025 Active   Discontinued 04/14/25 1126   metFORMIN (Glucophage) 500 mg tablet [id number]  TAKE 1 TABLET (500 MG) BY MOUTH ONCE DAILY WITH BREAKFAST. Malik Elaine MD  Active   OneTouch Delica Plus Lancet 33 gauge misc [id number]  use as directed Ximena Callejas MD  Active   OneTouch Ultra Test strip [id number]  Use as directed Malik Elaine MD  Active   potassium chloride CR (Klor-Con M20) 20 mEq ER tablet [id number]  Take 1 tablet (20 mEq) by mouth 2 times a day. Do not crush or chew. OSEAS Reid  Active   spironolactone (Aldactone) 25 mg tablet [id number] No Take 1 tablet (25 mg) by mouth once daily. Tracy M Schwab, APRN-CNP 3/17/2025 Active   umeclidinium-vilanteroL (Anoro Ellipta) 62.5-25 mcg/actuation blister with device [id number] No Inhale 1 puff once daily. Malik Elaine MD 3/17/2025 Active                    DISEASE MANAGEMENT ASSESSMENT:      CHF ASSESSMENT     Symptom/Staging:  -Most recent ejection fraction: 35-40      Results for orders placed during the hospital encounter of 04/11/25    Transthoracic Echo Complete    Narrative  [facility]  [address], [facility] [address]  Tel [phone] Fax [phone]    TRANSTHORACIC ECHOCARDIOGRAM REPORT    Patient Name:       ROGELIO ELI        Reading Physician:    [id number] Darrel Mota MD  Study Date:         4/11/2025           Ordering Provider:    [id number] TRACY M SCHWAB  MRN/PID:            [id number]            Fellow:  Accession#:         [id number]        Nurse:  Date of Birth/Age:  1965 / 60      Sonographer:          Yee murrell RDCS  Gender Assigned at                     Additional Staff:  Birth:  Height:             193.00 cm           Admit Date:  Weight:             102.06 kg           Admission Status:     Outpatient  BSA / BMI:          2.33 m2 / 27.40     Department Location:  [facility] Echo Lab  kg/m2  Blood Pressure: 130 /82 mmHg    Study Type:    TRANSTHORACIC ECHO (TTE) COMPLETE  Diagnosis/ICD: Dilated cardiomyopathy-I42.0  Indication:    dilated cardiomyopathy  CPT Codes:     Echo Complete w Full Doppler-[id number]  Study Detail: The following Echo studies were performed: 2D, M-Mode, Doppler and  color flow. Technically challenging study due to poor acoustic  windows.      PHYSICIAN INTERPRETATION:  Left Ventricle: The left ventricular systolic function is moderately decreased, with a visually estimated ejection fraction of 35-40%. There is severe concentric left ventricular hypertrophy. There is global hypokinesis of the left ventricle with minor regional variations. The left ventricular cavity size is normal. There is mild increased septal and moderately increased posterior left ventricular wall thickness. Spectral Doppler shows a Grade I (impaired relaxation pattern) of left ventricular diastolic filling with normal  left atrial filling pressure.  Left Atrium: The left atrial size is normal.  Right Ventricle: The right ventricle is normal in size. There is normal right ventricular global systolic function. A device is visualized in the right ventricle.  Right Atrium: The right atrial size is normal.  Aortic Valve: The aortic valve is trileaflet. There is no evidence of aortic valve regurgitation. The peak instantaneous gradient of the aortic valve is 11 mmHg.  Mitral Valve: The mitral valve is normal in structure. There is no evidence of mitral valve regurgitation.  Tricuspid Valve: The tricuspid valve is structurally normal. No evidence of tricuspid regurgitation.  Pulmonic Valve: The pulmonic valve is structurally normal. There is no indication of pulmonic valve regurgitation.  Pericardium: No pericardial effusion noted.  Aorta: The aortic root is normal.      CONCLUSIONS:  1. The left ventricular systolic function is moderately decreased, with a visually estimated ejection fraction of 35-40%.  2. There is global hypokinesis of the left ventricle with minor regional variations.  3. Spectral Doppler shows a Grade I (impaired relaxation pattern) of left ventricular diastolic filling with normal left atrial filling pressure.  4. There is severe concentric left ventricular hypertrophy.  5. There is moderately increased posterior left ventricular wall thickness.    QUANTITATIVE DATA SUMMARY:    2D MEASUREMENTS:             Normal Ranges:  LAs:             4.40 cm     (2.7-4.0cm)  IVSd:            1.30 cm     (0.6-1.1cm)  LVPWd:           1.50 cm     (0.6-1.1cm)  LVIDd:           6.30 cm     (3.9-5.9cm)  LVIDs:           4.80 cm  LV Mass Index:   180.1 g/m2  LVEDV Index:     68.62 ml/m2  LV % FS          23.8 %      LEFT ATRIUM:                Normal Ranges:  LA Area A4C:     15.8 cm2  LA Area A2C:     15.6 cm2  LA Volume Index: 16.7 ml/m2      M-MODE MEASUREMENTS:         Normal Ranges:  Ao Root:             3.70 cm  (2.0-3.7cm)  AoV Exc:             2.20 cm (1.5-2.5cm)      AORTA MEASUREMENTS:         Normal Ranges:  AoV Exc:            2.20 cm (1.5-2.5cm)  Asc Ao, d:          2.90 cm (2.1-3.4cm)      LV SYSTOLIC FUNCTION:  Normal Ranges:  EF-A4C View:    39 % (>=55%)  EF-A2C View:    43 %  EF-Biplane:     40 %  EF-Visual:      38 %  LV EF Reported: 38 %      LV DIASTOLIC FUNCTION:           Normal Ranges:  MV Peak E:             0.70 m/s  (0.7-1.2 m/s)  MV Peak A:             0.85 m/s  (0.42-0.7 m/s)  E/A Ratio:             0.82      (1.0-2.2)  MV e'                  0.042 m/s (>8.0)  MV lateral e'          0.04 m/s  MV medial e'           0.05 m/s  E/e' Ratio:            16.72     (<8.0)      MITRAL VALVE:          Normal Ranges:  MV DT:        314 msec (150-240msec)      AORTIC VALVE:           Normal Ranges:  AoV Vmax:     1.65 m/s  (<=1.7m/s)  AoV Peak PG:  10.9 mmHg (<20mmHg)  LVOT Max Jass: 0.79 m/s  (<=1.1m/s)      RIGHT VENTRICLE:  RV Basal 4.20 cm  RV Mid   3.30 cm  RV Major 8.6 cm  TAPSE:   21.4 mm  RV s'    0.12 m/s      [id number] Darrel Mota MD  Electronically signed on 4/11/2025 at 2:08:54 PM        ** Final **      Guideline-Directed Medical Therapy:  -ARNI: Yes, describe: entresto 24-26 bid    -Beta Blocker: Yes, describe: carvedilol 25mg bid  -MRA: Yes, describe: spironolactone 25mg qd  -SGLT2i: Yes, describe: jardiance 10mg qd    Secondary Prevention:  -The ASCVD Risk score (Graciela DK, et al., 2019) failed to calculate for the following reasons:    Risk score cannot be calculated because patient has a medical history suggesting prior/existing ASCVD   -Aspirin 81mg? no  -Statin?: Yes, describe: atorva 40mg qd        Affordability: no cost issues medicaid.        Past In Office Weight Readings:   Wt Readings from Last 6 Encounters:   03/17/25 102 kg (225 lb)   03/14/25 102 kg (225 lb 12.8 oz)   01/24/25 103 kg (227 lb)   12/27/24 105 kg (232 lb)   12/15/24 109 kg (240 lb)   09/13/24 107 kg (234 lb 12.8 oz)  "      Monitoring Blood Pressure at Home: Yes    Past In Office BP Readings:   BP Readings from Last 6 Encounters:   03/17/25 140/76   03/14/25 130/82   01/24/25 82/52   12/27/24 118/60   12/15/24 122/69   09/13/24 102/62   Reports home bps generally in the 110s    HEALTH MANAGEMENT    Maintaining fluid restriction (<2 L/day): No  Edema/swelling: No  Shortness of breath: No  Trouble sleeping/lying down: No  Dry/hacking cough: No  Recent Hospitalizations: No    EDUCATION/COUNSELING:   - Counseled patient on MOA, expectations, duration of therapy, contraindications, administration, and monitoring parameters  - Counseled patient on lifestyle modifications that can decrease your risk of having complications (smoking cessation, losing weight, daily weights, vaccines)  - Counseled patient on fluid intake and weight management. Recommended to not consume more than 2 liters of fliuids per day. If they have gained more than 2-3 pounds within a 24 hours period (or 5 pounds in a week), contact their cardiologist  - Answered all patient questions and concerns        DISCUSSION/NOTES:   No issues today. Reported some \"off feelings\" when switching from lisinopril to entresto but has since resolved. Reported bps at home on the softer end so no titration today. Following up with cardio on thursday    ASSESSMENT:    Assessment/Plan   Problem List Items Addressed This Visit    None        RECOMMENDATIONS/PLAN:    Continue current meds as prescribed    Last Appnt with Referring Provider: 4/18/25  Next Appnt with Referring Provider: 5/22/25  Clinical Pharmacist follow up: prn  VAF/Application Expiration: No  Type of Encounter: In-person    Dominick Sidhu PharmD    Verbal consent to manage patient's drug therapy was obtained from the patient . They were informed they may decline to participate or withdraw from participation in pharmacy services at any time.    Continue all meds under the continuation of care with the referring provider " and clinical pharmacy team.           [1] No Known Allergies  [2]   Past Medical History:  Diagnosis Date    Cardiomyopathy, unspecified 07/21/2022    Cardiomyopathy    Encounter for adjustment and management of automatic implantable cardiac defibrillator 09/18/2020    ICD (implantable cardioverter-defibrillator) battery depletion    Encounter for other preprocedural examination 08/17/2021    Pre-op exam    Encounter for other preprocedural examination 09/27/2021    Pre-op examination    Encounter for other preprocedural examination 07/21/2021    Preoperative clearance    Encounter for screening for malignant neoplasm of colon 05/04/2021    Screen for colon cancer    Encounter for screening for malignant neoplasm of prostate 11/02/2021    Screening PSA (prostate specific antigen)    Encounter for screening for malignant neoplasm of prostate 05/04/2021    Screening PSA (prostate specific antigen)    Other fecal abnormalities 06/26/2021    Positive FIT (fecal immunochemical test)    Other symptoms and signs involving the nervous system 01/09/2018    Suspected sleep apnea    Personal history of other diseases of the circulatory system     History of coronary atherosclerosis    Personal history of other diseases of the digestive system 06/30/2021    History of constipation    Right lower quadrant pain 06/03/2021    Right inguinal pain    Tobacco abuse counseling 03/12/2019    Encounter for smoking cessation counseling   [3]   Current Outpatient Medications on File Prior to Visit   Medication Sig Dispense Refill    albuterol 90 mcg/actuation inhaler Inhale 2 puffs every 6 hours if needed for wheezing. 8 g 11    atorvastatin (Lipitor) 40 mg tablet Take 1 tablet (40 mg) by mouth once daily. 90 tablet 3    blood sugar diagnostic (FreeStyle Test) strip Use as directed. 100 strip 1    blood-glucose meter (OneTouch Ultra2 Meter) misc TEST BID 1 each 0    carvedilol (Coreg) 25 mg tablet Take 1 tablet (25 mg) by mouth 2 times  daily (morning and late afternoon). 180 tablet 3    cyclobenzaprine (Flexeril) 10 mg tablet TAKE 1 TABLET (10 MG) BY MOUTH ONCE DAILY AT BEDTIME. 30 tablet 5    digoxin (Lanoxin) 125 MCG tablet Take 1 tablet (125 mcg) by mouth once daily. 90 tablet 3    empagliflozin (Jardiance) 10 mg Take 1 tablet (10 mg) by mouth once daily. 90 tablet 3    gabapentin (Neurontin) 800 mg tablet Take 1 tablet (800 mg) by mouth once daily.      glimepiride (AmaryL) 2 mg tablet Take 1 tablet (2 mg) by mouth once daily in the morning. Take before meals. 30 tablet 5    Klor-Con M20 20 mEq ER tablet TAKE 1 TABLET BY MOUTH 2 TIMES A DAY. DO NOT CRUSH OR CHEW. 180 tablet 1    lancets misc Use as directed 100 each 1    lisinopril 40 mg tablet Take 1 tablet (40 mg) by mouth once daily. 90 tablet 3    metFORMIN (Glucophage) 500 mg tablet TAKE 1 TABLET (500 MG) BY MOUTH ONCE DAILY WITH BREAKFAST. 30 tablet 5    OneTouch Delica Plus Lancet 33 gauge misc use as directed      OneTouch Ultra Test USE AS DIRECTED 100 strip 1    sacubitriL-valsartan (Entresto) 24-26 mg tablet Take 1 tablet by mouth 2 times a day. 180 tablet 3    spironolactone (Aldactone) 25 mg tablet Take 1 tablet (25 mg) by mouth once daily. 90 tablet 3    umeclidinium-vilanteroL (Anoro Ellipta) 62.5-25 mcg/actuation blister with device Inhale 1 puff once daily. 60 each 11    [DISCONTINUED] OneTouch Ultra Test strip Use as directed 100 each 1    [DISCONTINUED] potassium chloride CR (Klor-Con M20) 20 mEq ER tablet Take 1 tablet (20 mEq) by mouth 2 times a day. Do not crush or chew.       No current facility-administered medications on file prior to visit.

## 2025-05-22 ENCOUNTER — APPOINTMENT (OUTPATIENT)
Dept: CARDIOLOGY | Facility: CLINIC | Age: 60
End: 2025-05-22
Payer: COMMERCIAL

## 2025-05-22 VITALS
HEIGHT: 76 IN | DIASTOLIC BLOOD PRESSURE: 56 MMHG | BODY MASS INDEX: 27.28 KG/M2 | WEIGHT: 224 LBS | OXYGEN SATURATION: 92 % | SYSTOLIC BLOOD PRESSURE: 98 MMHG | HEART RATE: 47 BPM

## 2025-05-22 DIAGNOSIS — E78.00 HYPERCHOLESTEROLEMIA: ICD-10-CM

## 2025-05-22 DIAGNOSIS — I10 ESSENTIAL HYPERTENSION: ICD-10-CM

## 2025-05-22 DIAGNOSIS — I48.91 ATRIAL FIBRILLATION, UNSPECIFIED TYPE (MULTI): ICD-10-CM

## 2025-05-22 DIAGNOSIS — I25.10 CORONARY ARTERY DISEASE INVOLVING NATIVE CORONARY ARTERY OF NATIVE HEART WITHOUT ANGINA PECTORIS: Primary | ICD-10-CM

## 2025-05-22 DIAGNOSIS — Z45.02 ICD (IMPLANTABLE CARDIOVERTER-DEFIBRILLATOR) BATTERY DEPLETION: ICD-10-CM

## 2025-05-22 DIAGNOSIS — I50.22 CHRONIC SYSTOLIC CONGESTIVE HEART FAILURE: ICD-10-CM

## 2025-05-22 DIAGNOSIS — I42.0 DILATED CARDIOMYOPATHY (MULTI): ICD-10-CM

## 2025-05-22 NOTE — PROGRESS NOTES
" [facility] [place]    Cardiology Office Follow-up: Follow-up    Patient previously seen for NIDCM, Chronic Systolic Heart Failure, s/p ICD, mild CAD ([facility] 2016), HTN, HLD, DM II, COPD & Active Smoker 1/2PPD .    At the previous encounter, the patient telemedicine follow-up by Melvi for diagnostic heart failure management.  He had complaint of chest discomfort.  Previous echo showed EF of 40% and global hypokinesis.  Lexiscan stress test was negative for ischemia with ejection fraction estimated at 18%.    After the encounter the patient completed the following testing: None    There were no interval changes in medical history before this appointment.    Today the patient reports: no symptoms, no chest pain or shortness of breath.  No lower extremity edema.  No defibrillator firing.  No palpitations or syncope.  Continues to smoke.    Additional recent non-cardiac testing includes: None    ROS: Remainder of 12 review of systems is negative aside from chief complaint.    PHYSICAL EXAM  Vitals:    05/22/25 1030   BP: 98/56   BP Location: Left arm   Patient Position: Sitting   Pulse: (!) 47   SpO2: 92%   Weight: 102 kg (224 lb)   Height: 1.93 m (6' 4\")     General: No acute distress, appears comfortable  Cardiac: Regular rate and rhythm with no murmurs rubs or gallops, normal S1-S2  Pulmonary: Clear to auscultation bilaterally with no rales or rhonchi, normal respiratory effort  Gastrointestinal: Soft abdomen with no tenderness or guarding, no rebound  Musculoskeletal: No lower extremity edema, normal  Neurological: Alert and oriented x 4, appropriate, moving all extremities well, normal affect  Psychiatric: Normal affect, mood appropriate to occasion  Skin: No rashes, hives or jaundice  HEENT: Normocephalic, atraumatic.  Pupils equal round and reactive.    Assessment/Plan   Diagnoses and all orders for this visit:  Coronary artery disease involving native coronary artery of native heart without angina pectoris  -     " Follow Up In Cardiology; Future  Atrial fibrillation, unspecified type (Multi)  Dilated cardiomyopathy (Multi)  Essential hypertension  Hypercholesterolemia  ICD (implantable cardioverter-defibrillator) battery depletion  Chronic systolic congestive heart failure      Assessment and plan (narrative):    Armaan De Jesus is a 60 y.o. male as above, doing well, no changes.  Appointment was to establish care with me.  Currently compensated    Followup: With Melvi as previously scheduled    Victor Manuel Gusman MD    Director of Interventional Cardiology  [facility] at [facility]

## 2025-05-27 ENCOUNTER — HOSPITAL ENCOUNTER (OUTPATIENT)
Dept: CARDIOLOGY | Facility: CLINIC | Age: 60
Discharge: HOME | End: 2025-05-27
Payer: COMMERCIAL

## 2025-05-27 DIAGNOSIS — Z95.810 BIVENTRICULAR ICD (IMPLANTABLE CARDIOVERTER-DEFIBRILLATOR) IN PLACE: ICD-10-CM

## 2025-05-27 DIAGNOSIS — I47.20 PAROXYSMAL VENTRICULAR TACHYCARDIA: ICD-10-CM

## 2025-05-27 DIAGNOSIS — I42.0 DILATED CARDIOMYOPATHY (MULTI): ICD-10-CM

## 2025-05-27 PROCEDURE — 93296 REM INTERROG EVL PM/IDS: CPT

## 2025-06-10 ENCOUNTER — HOSPITAL ENCOUNTER (EMERGENCY)
Facility: HOSPITAL | Age: 60
Discharge: HOME | End: 2025-06-10
Attending: STUDENT IN AN ORGANIZED HEALTH CARE EDUCATION/TRAINING PROGRAM
Payer: COMMERCIAL

## 2025-06-10 ENCOUNTER — APPOINTMENT (OUTPATIENT)
Dept: RADIOLOGY | Facility: HOSPITAL | Age: 60
End: 2025-06-10
Payer: COMMERCIAL

## 2025-06-10 VITALS
HEIGHT: 76 IN | BODY MASS INDEX: 26.79 KG/M2 | DIASTOLIC BLOOD PRESSURE: 84 MMHG | HEART RATE: 60 BPM | OXYGEN SATURATION: 93 % | SYSTOLIC BLOOD PRESSURE: 149 MMHG | TEMPERATURE: 97.5 F | RESPIRATION RATE: 16 BRPM | WEIGHT: 220 LBS

## 2025-06-10 DIAGNOSIS — K63.89 EPIPLOIC APPENDAGITIS: ICD-10-CM

## 2025-06-10 DIAGNOSIS — Q45.3 ANOMALY OF PANCREAS: ICD-10-CM

## 2025-06-10 DIAGNOSIS — K59.00 CONSTIPATION, UNSPECIFIED CONSTIPATION TYPE: Primary | ICD-10-CM

## 2025-06-10 LAB
ALBUMIN SERPL BCP-MCNC: 4.1 G/DL (ref 3.4–5)
ALP SERPL-CCNC: 47 U/L (ref 33–136)
ALT SERPL W P-5'-P-CCNC: 9 U/L (ref 10–52)
ANION GAP SERPL CALC-SCNC: 10 MMOL/L (ref 10–20)
APPEARANCE UR: CLEAR
AST SERPL W P-5'-P-CCNC: 9 U/L (ref 9–39)
BASOPHILS # BLD AUTO: 0.05 X10*3/UL (ref 0–0.1)
BASOPHILS NFR BLD AUTO: 0.6 %
BILIRUB SERPL-MCNC: 0.4 MG/DL (ref 0–1.2)
BILIRUB UR STRIP.AUTO-MCNC: NEGATIVE MG/DL
BUN SERPL-MCNC: 11 MG/DL (ref 6–23)
CALCIUM SERPL-MCNC: 9.8 MG/DL (ref 8.6–10.3)
CHLORIDE SERPL-SCNC: 101 MMOL/L (ref 98–107)
CO2 SERPL-SCNC: 30 MMOL/L (ref 21–32)
COLOR UR: ABNORMAL
CREAT SERPL-MCNC: 0.75 MG/DL (ref 0.5–1.3)
EGFRCR SERPLBLD CKD-EPI 2021: >90 ML/MIN/1.73M*2
EOSINOPHIL # BLD AUTO: 0.19 X10*3/UL (ref 0–0.7)
EOSINOPHIL NFR BLD AUTO: 2.3 %
ERYTHROCYTE [DISTWIDTH] IN BLOOD BY AUTOMATED COUNT: 12.5 % (ref 11.5–14.5)
GLUCOSE SERPL-MCNC: 137 MG/DL (ref 74–99)
GLUCOSE UR STRIP.AUTO-MCNC: ABNORMAL MG/DL
HCT VFR BLD AUTO: 48.6 % (ref 41–52)
HGB BLD-MCNC: 15.2 G/DL (ref 13.5–17.5)
IMM GRANULOCYTES # BLD AUTO: 0.02 X10*3/UL (ref 0–0.7)
IMM GRANULOCYTES NFR BLD AUTO: 0.2 % (ref 0–0.9)
KETONES UR STRIP.AUTO-MCNC: NEGATIVE MG/DL
LACTATE SERPL-SCNC: 1.2 MMOL/L (ref 0.4–2)
LEUKOCYTE ESTERASE UR QL STRIP.AUTO: NEGATIVE
LIPASE SERPL-CCNC: 63 U/L (ref 9–82)
LYMPHOCYTES # BLD AUTO: 2.28 X10*3/UL (ref 1.2–4.8)
LYMPHOCYTES NFR BLD AUTO: 27.2 %
MCH RBC QN AUTO: 29.6 PG (ref 26–34)
MCHC RBC AUTO-ENTMCNC: 31.3 G/DL (ref 32–36)
MCV RBC AUTO: 95 FL (ref 80–100)
MONOCYTES # BLD AUTO: 0.73 X10*3/UL (ref 0.1–1)
MONOCYTES NFR BLD AUTO: 8.7 %
NEUTROPHILS # BLD AUTO: 5.1 X10*3/UL (ref 1.2–7.7)
NEUTROPHILS NFR BLD AUTO: 61 %
NITRITE UR QL STRIP.AUTO: NEGATIVE
NRBC BLD-RTO: 0 /100 WBCS (ref 0–0)
PH UR STRIP.AUTO: 6 [PH]
PLATELET # BLD AUTO: 198 X10*3/UL (ref 150–450)
POTASSIUM SERPL-SCNC: 4.3 MMOL/L (ref 3.5–5.3)
PROT SERPL-MCNC: 6.4 G/DL (ref 6.4–8.2)
PROT UR STRIP.AUTO-MCNC: NEGATIVE MG/DL
RBC # BLD AUTO: 5.13 X10*6/UL (ref 4.5–5.9)
RBC # UR STRIP.AUTO: NEGATIVE MG/DL
SODIUM SERPL-SCNC: 137 MMOL/L (ref 136–145)
SP GR UR STRIP.AUTO: 1.03
UROBILINOGEN UR STRIP.AUTO-MCNC: NORMAL MG/DL
WBC # BLD AUTO: 8.4 X10*3/UL (ref 4.4–11.3)

## 2025-06-10 PROCEDURE — 2500000004 HC RX 250 GENERAL PHARMACY W/ HCPCS (ALT 636 FOR OP/ED)

## 2025-06-10 PROCEDURE — 96375 TX/PRO/DX INJ NEW DRUG ADDON: CPT

## 2025-06-10 PROCEDURE — 83605 ASSAY OF LACTIC ACID: CPT

## 2025-06-10 PROCEDURE — 2550000001 HC RX 255 CONTRASTS: Performed by: STUDENT IN AN ORGANIZED HEALTH CARE EDUCATION/TRAINING PROGRAM

## 2025-06-10 PROCEDURE — 99285 EMERGENCY DEPT VISIT HI MDM: CPT | Mod: 25 | Performed by: STUDENT IN AN ORGANIZED HEALTH CARE EDUCATION/TRAINING PROGRAM

## 2025-06-10 PROCEDURE — 85025 COMPLETE CBC W/AUTO DIFF WBC: CPT | Performed by: STUDENT IN AN ORGANIZED HEALTH CARE EDUCATION/TRAINING PROGRAM

## 2025-06-10 PROCEDURE — 99285 EMERGENCY DEPT VISIT HI MDM: CPT | Performed by: STUDENT IN AN ORGANIZED HEALTH CARE EDUCATION/TRAINING PROGRAM

## 2025-06-10 PROCEDURE — 74177 CT ABD & PELVIS W/CONTRAST: CPT

## 2025-06-10 PROCEDURE — 74177 CT ABD & PELVIS W/CONTRAST: CPT | Performed by: RADIOLOGY

## 2025-06-10 PROCEDURE — 80053 COMPREHEN METABOLIC PANEL: CPT | Performed by: STUDENT IN AN ORGANIZED HEALTH CARE EDUCATION/TRAINING PROGRAM

## 2025-06-10 PROCEDURE — 36415 COLL VENOUS BLD VENIPUNCTURE: CPT | Performed by: STUDENT IN AN ORGANIZED HEALTH CARE EDUCATION/TRAINING PROGRAM

## 2025-06-10 PROCEDURE — 81003 URINALYSIS AUTO W/O SCOPE: CPT

## 2025-06-10 PROCEDURE — 96374 THER/PROPH/DIAG INJ IV PUSH: CPT

## 2025-06-10 PROCEDURE — 83690 ASSAY OF LIPASE: CPT | Performed by: STUDENT IN AN ORGANIZED HEALTH CARE EDUCATION/TRAINING PROGRAM

## 2025-06-10 RX ORDER — ONDANSETRON HYDROCHLORIDE 2 MG/ML
4 INJECTION, SOLUTION INTRAVENOUS ONCE
Status: COMPLETED | OUTPATIENT
Start: 2025-06-10 | End: 2025-06-10

## 2025-06-10 RX ORDER — SYRING-NEEDL,DISP,INSUL,0.3 ML 29 G X1/2"
296 SYRINGE, EMPTY DISPOSABLE MISCELLANEOUS 2 TIMES DAILY
Qty: 592 ML | Refills: 0 | Status: SHIPPED | OUTPATIENT
Start: 2025-06-10 | End: 2025-06-11

## 2025-06-10 RX ORDER — MORPHINE SULFATE 4 MG/ML
4 INJECTION, SOLUTION INTRAMUSCULAR; INTRAVENOUS ONCE
Status: COMPLETED | OUTPATIENT
Start: 2025-06-10 | End: 2025-06-10

## 2025-06-10 RX ORDER — NAPROXEN 500 MG/1
500 TABLET ORAL
Qty: 20 TABLET | Refills: 0 | Status: SHIPPED | OUTPATIENT
Start: 2025-06-10 | End: 2025-06-20

## 2025-06-10 RX ADMIN — MORPHINE SULFATE 4 MG: 4 INJECTION, SOLUTION INTRAMUSCULAR; INTRAVENOUS at 14:06

## 2025-06-10 RX ADMIN — ONDANSETRON 4 MG: 2 INJECTION INTRAMUSCULAR; INTRAVENOUS at 14:06

## 2025-06-10 RX ADMIN — IOHEXOL 75 ML: 350 INJECTION, SOLUTION INTRAVENOUS at 14:50

## 2025-06-10 ASSESSMENT — PAIN - FUNCTIONAL ASSESSMENT
PAIN_FUNCTIONAL_ASSESSMENT: 0-10
PAIN_FUNCTIONAL_ASSESSMENT: 0-10

## 2025-06-10 ASSESSMENT — COLUMBIA-SUICIDE SEVERITY RATING SCALE - C-SSRS
2. HAVE YOU ACTUALLY HAD ANY THOUGHTS OF KILLING YOURSELF?: NO
1. IN THE PAST MONTH, HAVE YOU WISHED YOU WERE DEAD OR WISHED YOU COULD GO TO SLEEP AND NOT WAKE UP?: NO
6. HAVE YOU EVER DONE ANYTHING, STARTED TO DO ANYTHING, OR PREPARED TO DO ANYTHING TO END YOUR LIFE?: NO

## 2025-06-10 ASSESSMENT — PAIN SCALES - GENERAL
PAINLEVEL_OUTOF10: 5 - MODERATE PAIN
PAINLEVEL_OUTOF10: 10 - WORST POSSIBLE PAIN
PAINLEVEL_OUTOF10: 3
PAINLEVEL_OUTOF10: 10 - WORST POSSIBLE PAIN

## 2025-06-10 ASSESSMENT — PAIN DESCRIPTION - PAIN TYPE: TYPE: ACUTE PAIN

## 2025-06-10 ASSESSMENT — PAIN DESCRIPTION - LOCATION: LOCATION: ABDOMEN

## 2025-06-10 NOTE — DISCHARGE INSTRUCTIONS
Take 1 bottle of the magnesium citrate in the morning followed by 1 later in the evening.  Focus on hydration.  I would recommend electrolyte solutions as well.  Take the naproxen as prescribed.  Follow-up with gastroenterology regarding the nonspecific findings of your pancreas at your earliest convenience.  If you develop uncontrollable pain, vomiting, fevers, or other worrisome symptoms, return to the ER.

## 2025-06-10 NOTE — ED PROVIDER NOTES
EMERGENCY DEPARTMENT ENCOUNTER      Pt Name: Armaan De Jesus  MRN: [id number]  Birthdate 1965  Date of evaluation: 6/10/2025  Provider: Parmjit Stokes MD    CHIEF COMPLAINT       Chief Complaint   Patient presents with    Abdominal Pain     Patient presents to the ED with ABD pain, Constipation and Nausea a several days. States he took a percocet before coming in.          HISTORY OF PRESENT ILLNESS    HPI  Patient is a 60-year-old male with history of COPD, dilated cardiomyopathy, s/p ICD with pacemaker placement, HFrEF, HTN, HLD, DM 2, hepatitis C presenting with abdominal pain.  Is been ongoing for the past week.  Is right lower quadrant, 10/10, radiating to the back, constant, without consistently being exacerbating factors.  Other than a small pebble-like bowel movement this morning, he has not had a substantial bowel movement in approximately 5 days.  He notes nausea without vomiting.  He denies fever, sweats, chills, chest pain, shortness of breath, dysuria, hematuria, black or bloody stools.    Nursing Notes were reviewed.    PAST MEDICAL HISTORY   Medical History[1]      SURGICAL HISTORY     Surgical History[2]      CURRENT MEDICATIONS       Previous Medications    ALBUTEROL 90 MCG/ACTUATION INHALER    Inhale 2 puffs every 6 hours if needed for wheezing.    ATORVASTATIN (LIPITOR) 40 MG TABLET    Take 1 tablet (40 mg) by mouth once daily.    BLOOD SUGAR DIAGNOSTIC (FREESTYLE TEST) STRIP    Use as directed.    BLOOD-GLUCOSE METER (ONETOUCH ULTRA2 METER) MISC    TEST BID    CARVEDILOL (COREG) 25 MG TABLET    Take 1 tablet (25 mg) by mouth 2 times daily (morning and late afternoon).    CYCLOBENZAPRINE (FLEXERIL) 10 MG TABLET    TAKE 1 TABLET (10 MG) BY MOUTH ONCE DAILY AT BEDTIME.    DIGOXIN (LANOXIN) 125 MCG TABLET    Take 1 tablet (125 mcg) by mouth once daily.    EMPAGLIFLOZIN (JARDIANCE) 10 MG    Take 1 tablet (10 mg) by mouth once daily.    GABAPENTIN (NEURONTIN) 800 MG TABLET    Take 1 tablet (800 mg) by  mouth once daily.    GLIMEPIRIDE (AMARYL) 2 MG TABLET    Take 1 tablet (2 mg) by mouth once daily in the morning. Take before meals.    KLOR-CON M20 20 MEQ ER TABLET    TAKE 1 TABLET BY MOUTH 2 TIMES A DAY. DO NOT CRUSH OR CHEW.    LANCETS MISC    Use as directed    METFORMIN (GLUCOPHAGE) 500 MG TABLET    TAKE 1 TABLET (500 MG) BY MOUTH ONCE DAILY WITH BREAKFAST.    ONETOUCH DELICA PLUS LANCET 33 GAUGE MISC    use as directed    ONETOUCH ULTRA TEST    USE AS DIRECTED    SACUBITRIL-VALSARTAN (ENTRESTO) 24-26 MG TABLET    Take 1 tablet by mouth 2 times a day.    SPIRONOLACTONE (ALDACTONE) 25 MG TABLET    Take 1 tablet (25 mg) by mouth once daily.    UMECLIDINIUM-VILANTEROL (ANORO ELLIPTA) 62.5-25 MCG/ACTUATION BLISTER WITH DEVICE    Inhale 1 puff once daily.       ALLERGIES     Patient has no known allergies.    FAMILY HISTORY     Family History[3]       SOCIAL HISTORY     Social History[4]    SCREENINGS                        PHYSICAL EXAM    (up to 7 for level 4, 8 or more for level 5)     ED Triage Vitals [06/10/25 1330]   Temperature Heart Rate Respirations BP   36.3 °C (97.3 °F) 60 18 141/82      Pulse Ox Temp Source Heart Rate Source Patient Position   95 % Temporal Monitor Sitting      BP Location FiO2 (%)     Right arm --       Physical Exam  Constitutional:       General: He is not in acute distress.     Appearance: He is not toxic-appearing.   HENT:      Head: Normocephalic and atraumatic.      Mouth/Throat:      Mouth: Mucous membranes are moist.      Pharynx: Oropharynx is clear.   Eyes:      General: No scleral icterus.     Extraocular Movements: Extraocular movements intact.   Cardiovascular:      Rate and Rhythm: Normal rate and regular rhythm.      Heart sounds: Normal heart sounds.   Pulmonary:      Effort: Pulmonary effort is normal. No respiratory distress.      Breath sounds: Normal breath sounds.   Abdominal:      General: There is no distension.      Palpations: Abdomen is soft.      Tenderness:  There is abdominal tenderness in the right lower quadrant. There is right CVA tenderness. There is no left CVA tenderness, guarding or rebound.      Comments: There is a palpable reducible hernia in the right lower quadrant   Skin:     General: Skin is warm and dry.   Neurological:      General: No focal deficit present.          DIAGNOSTIC RESULTS     LABS:  Labs Reviewed   CBC WITH AUTO DIFFERENTIAL - Abnormal       Result Value    WBC 8.4      nRBC 0.0      RBC 5.13      Hemoglobin 15.2      Hematocrit 48.6      MCV 95      MCH 29.6      MCHC 31.3 (*)     RDW 12.5      Platelets 198      Neutrophils % 61.0      Immature Granulocytes %, Automated 0.2      Lymphocytes % 27.2      Monocytes % 8.7      Eosinophils % 2.3      Basophils % 0.6      Neutrophils Absolute 5.10      Immature Granulocytes Absolute, Automated 0.02      Lymphocytes Absolute 2.28      Monocytes Absolute 0.73      Eosinophils Absolute 0.19      Basophils Absolute 0.05     COMPREHENSIVE METABOLIC PANEL - Abnormal    Glucose 137 (*)     Sodium 137      Potassium 4.3      Chloride 101      Bicarbonate 30      Anion Gap 10      Urea Nitrogen 11      Creatinine 0.75      eGFR >90      Calcium 9.8      Albumin 4.1      Alkaline Phosphatase 47      Total Protein 6.4      AST 9      Bilirubin, Total 0.4      ALT 9 (*)    URINALYSIS WITH REFLEX CULTURE AND MICROSCOPIC - Abnormal    Color, Urine Light-Yellow      Appearance, Urine Clear      Specific Gravity, Urine 1.031      pH, Urine 6.0      Protein, Urine NEGATIVE      Glucose, Urine OVER (4+) (*)     Blood, Urine NEGATIVE      Ketones, Urine NEGATIVE      Bilirubin, Urine NEGATIVE      Urobilinogen, Urine Normal      Nitrite, Urine NEGATIVE      Leukocyte Esterase, Urine NEGATIVE      Narrative:     OVER is reported when the result is greater than the clinically reportable range.   LIPASE - Normal    Lipase 63      Narrative:     Venipuncture immediately after or during the administration of  Metamizole may lead to falsely low results. Testing should be performed immediately prior to Metamizole dosing.   LACTATE - Normal    Lactate 1.2      Narrative:     Venipuncture immediately after or during the administration of Metamizole may lead to falsely low results. Testing should be performed immediately prior to Metamizole dosing.   URINALYSIS WITH REFLEX CULTURE AND MICROSCOPIC    Narrative:     The following orders were created for panel order Urinalysis with Reflex Culture and Microscopic.  Procedure                               Abnormality         Status                     ---------                               -----------         ------                     Urinalysis with Reflex C...[[id number]]  Abnormal            Final result               Extra Urine Gray Tube[[id number]]                            In process                   Please view results for these tests on the individual orders.   EXTRA URINE GRAY TUBE       All other labs were within normal range or not returned as of this dictation.    Imaging  CT abdomen pelvis w IV contrast   Final Result   1. Patchy areas of atelectatic change at the pulmonary bases   bilaterally along with a fissural nodule. Follow-up to assure   resolution of the areas of atelectatic change or recommended.   2. Slight fullness of the pancreatic head with slight haziness of the   adjacent fat suggesting acute pancreatitis.   3. Bilateral renal cysts. Bilateral nephrolithiasis. No obstructing   calculi are identified.   4. There is suspicion of occlusion of the external iliac arteries   bilaterally with reconstitution of the vessel is distally along with   probable short-segment high-grade stenosis versus occlusion of the   right superficial femoral artery. Please correlate with clinical   examination to determine whether vascular surgery assessment is   warranted.   5. Large amount of stool within the right hemicolon and transverse   colon.   6. Left-sided colonic  diverticulosis.   7. Findings suggesting epiploic appendagitis within the pelvis, a   self-limited nonsurgical entity.   8. Small mesenteric fat containing umbilical hernia.             MACRO:   Undo none        Signed by: Wing Ceja 6/10/2025 3:30 PM   Dictation workstation:   [id number]           Procedures  Procedures     EMERGENCY DEPARTMENT COURSE/MDM:     ED Course as of 06/10/25 1604   Tue Neville 10, 2025   1427 Lactate: 1.2   not concerning for bowel ischemia [FN]   1510 On my interpretation CT abdomen pelvis does not show obstruction.  There is a large stool burden [FN]      ED Course User Index  [FN] Serafin Trinidad MD         Diagnoses as of 06/10/25 1604   Constipation, unspecified constipation type   Epiploic appendagitis   Anomaly of pancreas        Medical Decision Making  History obtained from the patient.  Records including labs, imaging, notes independently reviewed by me.  Presentation concerning for possible pancreatitis, alcoholic appendagitis, constipation, hernia, SBO, ileus.  Patient given Zofran and morphine with improvement in his symptoms.  CBC without leukocytosis or other concerning finding.  CMP notable only for a mild hyperglycemia of 137 thought to be clinically noncontributory.  Lactate normal.  Lipase within normal limits.  Urinalysis unremarkable.  CT consistent with constipation and epiploic appendagitis.  There was concern for mild edema of the head of the pancreas with surrounding fat stranding.  However, patient has no epigastric pain and his lipase was normal.  I believe this is more of an incidental nonspecific finding.  Patient to be referred to gastroenterology for an MRCP.  Patient discharged home in satisfactory condition with prescriptions for magnesium citrate and naproxen.  All questions answered and return precautions discussed.    Patient and or family in agreement and understanding of treatment plan.  All questions answered.      I reviewed the case with the  attending ED physician. The attending ED physician agrees with the plan. Patient and/or patient´s representative was counseled regarding labs, imaging, likely diagnosis, and plan. All questions were answered.    ED Medications administered this visit:    Medications   ondansetron (Zofran) injection 4 mg (4 mg intravenous Given 6/10/25 1406)   morphine injection 4 mg (4 mg intravenous Given 6/10/25 1406)   iohexol (OMNIPaque) 350 mg iodine/mL solution 75 mL (75 mL intravenous Given 6/10/25 1450)       New Prescriptions from this visit:    New Prescriptions    MAGNESIUM CITRATE SOLUTION    Take 296 mL by mouth 2 times a day for 1 day.    NAPROXEN (NAPROSYN) 500 MG TABLET    Take 1 tablet (500 mg) by mouth 2 times daily (morning and late afternoon) for 10 days.       Follow-up:  No follow-up provider specified.      Final Impression:   1. Constipation, unspecified constipation type    2. Epiploic appendagitis    3. Anomaly of pancreas          (Please note that portions of this note were completed with a voice recognition program.  Efforts were made to edit the dictations but occasionally words are mis-transcribed.)       [1]   Past Medical History:  Diagnosis Date    Cardiomyopathy, unspecified 07/21/2022    Cardiomyopathy    Encounter for adjustment and management of automatic implantable cardiac defibrillator 09/18/2020    ICD (implantable cardioverter-defibrillator) battery depletion    Encounter for other preprocedural examination 08/17/2021    Pre-op exam    Encounter for other preprocedural examination 09/27/2021    Pre-op examination    Encounter for other preprocedural examination 07/21/2021    Preoperative clearance    Encounter for screening for malignant neoplasm of colon 05/04/2021    Screen for colon cancer    Encounter for screening for malignant neoplasm of prostate 11/02/2021    Screening PSA (prostate specific antigen)    Encounter for screening for malignant neoplasm of prostate 05/04/2021     Screening PSA (prostate specific antigen)    Other fecal abnormalities 06/26/2021    Positive FIT (fecal immunochemical test)    Other symptoms and signs involving the nervous system 01/09/2018    Suspected sleep apnea    Personal history of other diseases of the circulatory system     History of coronary atherosclerosis    Personal history of other diseases of the digestive system 06/30/2021    History of constipation    Right lower quadrant pain 06/03/2021    Right inguinal pain    Tobacco abuse counseling 03/12/2019    Encounter for smoking cessation counseling   [2]   Past Surgical History:  Procedure Laterality Date    CARDIAC ELECTROPHYSIOLOGY PROCEDURE N/A 3/17/2025    Procedure: CRT-D Gen Change;  Surgeon: Ziyad Rodrigeuz MD;  Location: [facility];  Service: Electrophysiology;  Laterality: N/A;  Abbott, 13:30    OTHER SURGICAL HISTORY  08/18/2021    Inguinal hernia repair    OTHER SURGICAL HISTORY  07/30/2021    Colonoscopy    OTHER SURGICAL HISTORY  07/14/2021    Permanent pacemaker insertion    OTHER SURGICAL HISTORY  07/14/2021    Cardioverter defibrillator insertion    OTHER SURGICAL HISTORY  07/14/2021    Finger surgical procedure   [3]   Family History  Problem Relation Name Age of Onset    Hyperlipidemia Mother      Hyperlipidemia Father     [4]   Social History  Socioeconomic History    Marital status: Single   Tobacco Use    Smoking status: Every Day     Current packs/day: 0.50     Types: Cigarettes    Smokeless tobacco: Never   Vaping Use    Vaping status: Never Used   Substance and Sexual Activity    Alcohol use: Not Currently    Drug use: Yes     Frequency: 7.0 times per week     Types: Marijuana    Sexual activity: Defer        Parmjit Stokes MD  Resident  06/10/25 7804

## 2025-06-11 LAB — HOLD SPECIMEN: NORMAL

## 2025-06-27 ENCOUNTER — APPOINTMENT (OUTPATIENT)
Dept: PHARMACY | Facility: HOSPITAL | Age: 60
End: 2025-06-27
Payer: COMMERCIAL

## 2025-07-08 ENCOUNTER — APPOINTMENT (OUTPATIENT)
Dept: PHARMACY | Facility: HOSPITAL | Age: 60
End: 2025-07-08
Payer: COMMERCIAL

## 2025-07-14 ENCOUNTER — APPOINTMENT (OUTPATIENT)
Dept: GASTROENTEROLOGY | Facility: CLINIC | Age: 60
End: 2025-07-14
Payer: COMMERCIAL

## 2025-07-18 ENCOUNTER — TELEMEDICINE (OUTPATIENT)
Dept: PHARMACY | Facility: HOSPITAL | Age: 60
End: 2025-07-18
Payer: COMMERCIAL

## 2025-07-18 DIAGNOSIS — I50.22 CHRONIC SYSTOLIC CONGESTIVE HEART FAILURE: Primary | ICD-10-CM

## 2025-07-18 DIAGNOSIS — J44.9 CHRONIC OBSTRUCTIVE PULMONARY DISEASE, UNSPECIFIED COPD TYPE (MULTI): Primary | ICD-10-CM

## 2025-07-18 NOTE — PROGRESS NOTES
Patient ID: Armaan De Jesus is a 60 y.o. male who presents for COPD.  Pt is here for Follow Up appointment.     PCP/Referring Provider: Malik Elaine MD  Last Visit: 3.14.25  Next visit 9.23.25    Verbal consent to manage patient's drug therapy was obtained from patient. They were informed they may decline to participate or withdraw from participation in pharmacy services at any time.      Subjective   Treatment Adherence:   Patient did take medications today.   Number of missed doses in last 7 days: 0.       Preferred pharmacy: [facility]  Can patient afford prescribed medications: Yes, no issues reported    Interval History:  Recent ED visit for constipation  Patient attributes to medication switch from lisinopril to Entresto  He has since stopped Entresto and resumed previous lisinopril- he will need refills of lisinopril since previously discontinued, only able to do this with leftovers from bottle at home    PULMONARY ASSESSMENT  Patient has been diagnosed with: COPD  does not see a pulmonologist  Last visit: 9/2020 at [facility]  has not had PFT's completed in last 2 years - last in 2020    Current Regimen:  Albuterol PRN  Anoro Ellipta 1 puff daily    Historical Treatment:  Bevespi - unknown reason    Symptom Management:  Current symptoms: dyspnea  Triggers: exertion  Alleviating factors: takes albuterol, rest  Today states that breathing is stable on current regimen    Exacerbation Hx:  When was your last hospitalization for an exacerbation? None found  When was the last time you were treated with antibiotics and/or steroids? 2022     Rescue Inhaler Use:  How often do you use your rescue inhaler? 2 times per day if he is out    Appropriate Inhaler Technique: yes      Vaccines:  Influenza: Date [2018]  PPSV23: Date [2024]  COVID: Date [2021]    Does still smoke- not in home, always goes outside. Not planning to quit at this time.    Objective     BP Readings from Last 4 Encounters:   06/10/25 149/84   05/22/25 98/56    03/17/25 140/76   03/14/25 130/82      There were no vitals filed for this visit.     Labs  Creatinine   Date Value Ref Range Status   06/10/2025 0.75 0.50 - 1.30 mg/dL Final     eGFR   Date Value Ref Range Status   06/10/2025 >90 >60 mL/min/1.73m*2 Final     Comment:     Calculations of estimated GFR are performed using the 2021 CKD-EPI Study Refit equation without the race variable for the IDMS-Traceable creatinine methods.  https://jasn.asnjournals.org/content/early/2021/09/22/ASN.5656499430     Sodium   Date Value Ref Range Status   06/10/2025 137 136 - 145 mmol/L Final     Potassium   Date Value Ref Range Status   06/10/2025 4.3 3.5 - 5.3 mmol/L Final     Chloride   Date Value Ref Range Status   06/10/2025 101 98 - 107 mmol/L Final     Urea Nitrogen   Date Value Ref Range Status   06/10/2025 11 6 - 23 mg/dL Final     AST   Date Value Ref Range Status   06/10/2025 9 9 - 39 U/L Final        Lab Results   Component Value Date    BILITOT 0.4 06/10/2025    CALCIUM 9.8 06/10/2025    CO2 30 06/10/2025     06/10/2025    CREATININE 0.75 06/10/2025    GLUCOSE 137 (H) 06/10/2025    ALKPHOS 47 06/10/2025    K 4.3 06/10/2025    PROT 6.4 06/10/2025     06/10/2025    AST 9 06/10/2025    ALT 9 (L) 06/10/2025    BUN 11 06/10/2025    ANIONGAP 10 06/10/2025    MG 1.62 02/23/2024    ALBUMIN 4.1 06/10/2025    LIPASE 63 06/10/2025    GFRMALE 88 09/12/2023     Lab Results   Component Value Date    TRIG 199 (H) 09/12/2023    CHOL 142 09/12/2023    HDL 48.8 09/12/2023     Lab Results   Component Value Date    HGBA1C 8.2 07/11/2024     Current Outpatient Medications   Medication Instructions    albuterol 90 mcg/actuation inhaler 2 puffs, inhalation, Every 6 hours PRN    atorvastatin (LIPITOR) 40 mg, oral, Daily    blood sugar diagnostic (FreeStyle Test) strip Use as directed.    blood-glucose meter (OneTouch Ultra2 Meter) misc TEST BID    carvedilol (COREG) 25 mg, oral, 2 times daily (morning and late afternoon)     cyclobenzaprine (FLEXERIL) 10 mg, oral, Nightly    digoxin (LANOXIN) 125 mcg, oral, Daily    empagliflozin (JARDIANCE) 10 mg, oral, Daily    gabapentin (NEURONTIN) 800 mg, oral, Daily RT    glimepiride (AMARYL) 2 mg, oral, Daily before breakfast    Klor-Con M20 20 mEq ER tablet TAKE 1 TABLET BY MOUTH 2 TIMES A DAY. DO NOT CRUSH OR CHEW.    lancets misc Use as directed    metFORMIN (GLUCOPHAGE) 500 mg, oral, Daily with breakfast    OneTouch Delica Plus Lancet 33 gauge misc use as directed    OneTouch Ultra Test USE AS DIRECTED    spironolactone (ALDACTONE) 25 mg, oral, Daily    umeclidinium-vilanteroL (Anoro Ellipta) 62.5-25 mcg/actuation blister with device 1 puff, inhalation, Daily      DRUG INTERACTIONS:  Digoxin, atorvastatin and spironolactone: Concurrent use may result in elevated levels of and toxicity from digoxin. Symptoms of digoxin toxicity can include anorexia, nausea, vomiting, headache, fatigue, malaise, drowsiness, generalized muscle weakness, disorientation, hallucinations, visual disturbances, and arrhythmias. Ensure patient Is aware of monitoring parameters     Assessment/Plan   Problem List Items Addressed This Visit           ICD-10-CM    COPD (chronic obstructive pulmonary disease) (Multi) - Primary J44.9    Patient with COPD that is well controlled and stable. Based on CAT score, exacerbation history, and eosinophil count, patient is GOLD Group B and current LABA/LAMA therapy is recommended.     Medication Changes:  CONTINUE  Anoro 1 puff daily  Albuterol as needed    Future Considerations:  Can intensify to Trelegy if symptoms worsen  Continue assessing willingness to quit           Relevant Orders    Referral to Clinical Pharmacy       Immunizations needed: PCV, RSV  Labs ordered:  none  Referrals:  none     Follow-up: 1/16/2026 1PM     Patient was provided with PharmD phone number and encouraged to reach out with any questions or concerns Prior to next appointment or ask provider for another  pharmacy referral.    Time spent with pt: Total length of time 10 (minutes) of the encounter and more than 50% was spent counseling the patient.    Thank you for allowing to take part in the care of this patient.    Stephanie Mcconnell, PharmD  Clinical Pharmacist  [phone]    Continue all meds under the continuation of care with the referring provider and clinical pharmacy team.    Verbal consent to manage patient's drug therapy was obtained from the patient. They were informed they may decline to participate or withdraw from participation in pharmacy services at any time.

## 2025-07-18 NOTE — Clinical Note
Pending lisinopril at old strength as well- he stopped Entresto because of side effects and has already switched back to previous home supply of lisinopril

## 2025-07-18 NOTE — ASSESSMENT & PLAN NOTE
Patient with COPD that is well controlled and stable. Based on CAT score, exacerbation history, and eosinophil count, patient is GOLD Group B and current LABA/LAMA therapy is recommended.     Medication Changes:  CONTINUE  Anoro 1 puff daily  Albuterol as needed    Future Considerations:  Can intensify to Trelegy if symptoms worsen  Continue assessing willingness to quit

## 2025-07-19 RX ORDER — LISINOPRIL 40 MG/1
40 TABLET ORAL DAILY
Qty: 90 TABLET | Refills: 3 | Status: SHIPPED | OUTPATIENT
Start: 2025-07-19

## 2025-07-24 ENCOUNTER — HOSPITAL ENCOUNTER (OUTPATIENT)
Dept: CARDIOLOGY | Facility: CLINIC | Age: 60
Discharge: HOME | End: 2025-07-24
Payer: COMMERCIAL

## 2025-07-24 DIAGNOSIS — I42.0 DILATED CARDIOMYOPATHY (MULTI): ICD-10-CM

## 2025-07-24 DIAGNOSIS — Z95.810 BIVENTRICULAR ICD (IMPLANTABLE CARDIOVERTER-DEFIBRILLATOR) IN PLACE: ICD-10-CM

## 2025-07-24 DIAGNOSIS — I47.20 PAROXYSMAL VENTRICULAR TACHYCARDIA: ICD-10-CM

## 2025-08-01 DIAGNOSIS — M25.562 CHRONIC PAIN OF LEFT KNEE: ICD-10-CM

## 2025-08-01 DIAGNOSIS — G89.29 CHRONIC PAIN OF LEFT KNEE: ICD-10-CM

## 2025-08-01 DIAGNOSIS — I25.10 ARTERIOSCLEROSIS OF CORONARY ARTERY: ICD-10-CM

## 2025-08-01 DIAGNOSIS — E78.00 HYPERCHOLESTEROLEMIA: ICD-10-CM

## 2025-08-01 RX ORDER — GABAPENTIN 800 MG/1
800 TABLET ORAL
Qty: 90 TABLET | Refills: 1 | Status: SHIPPED | OUTPATIENT
Start: 2025-08-01

## 2025-08-01 RX ORDER — ATORVASTATIN CALCIUM 40 MG/1
40 TABLET, FILM COATED ORAL DAILY
Qty: 90 TABLET | Refills: 1 | Status: SHIPPED | OUTPATIENT
Start: 2025-08-01

## 2025-08-11 DIAGNOSIS — I42.9 CARDIOMYOPATHY, UNSPECIFIED TYPE (MULTI): ICD-10-CM

## 2025-08-19 PROBLEM — Z95.0 PACEMAKER: Status: RESOLVED | Noted: 2023-07-20 | Resolved: 2025-08-19

## 2025-08-22 DIAGNOSIS — E11.9 TYPE 2 DIABETES MELLITUS WITHOUT COMPLICATION, WITHOUT LONG-TERM CURRENT USE OF INSULIN: ICD-10-CM

## 2025-08-22 RX ORDER — BLOOD SUGAR DIAGNOSTIC
STRIP MISCELLANEOUS
Qty: 100 STRIP | Refills: 1 | Status: SHIPPED | OUTPATIENT
Start: 2025-08-22

## 2025-08-25 ENCOUNTER — APPOINTMENT (OUTPATIENT)
Dept: CARDIOLOGY | Facility: CLINIC | Age: 60
End: 2025-08-25
Payer: COMMERCIAL

## 2025-08-26 ENCOUNTER — APPOINTMENT (OUTPATIENT)
Dept: CARDIOLOGY | Facility: CLINIC | Age: 60
End: 2025-08-26
Payer: COMMERCIAL

## 2025-08-30 DIAGNOSIS — E11.9 TYPE 2 DIABETES MELLITUS WITHOUT COMPLICATION, WITHOUT LONG-TERM CURRENT USE OF INSULIN: ICD-10-CM

## 2025-09-02 RX ORDER — LANCETS 33 GAUGE
EACH MISCELLANEOUS
Qty: 100 EACH | Refills: 1 | Status: SHIPPED | OUTPATIENT
Start: 2025-09-02

## 2025-09-23 ENCOUNTER — APPOINTMENT (OUTPATIENT)
Dept: PRIMARY CARE | Facility: CLINIC | Age: 60
End: 2025-09-23
Payer: COMMERCIAL

## 2025-10-23 ENCOUNTER — APPOINTMENT (OUTPATIENT)
Dept: CARDIOLOGY | Facility: CLINIC | Age: 60
End: 2025-10-23
Payer: COMMERCIAL

## 2025-11-24 ENCOUNTER — APPOINTMENT (OUTPATIENT)
Dept: CARDIOLOGY | Facility: CLINIC | Age: 60
End: 2025-11-24
Payer: COMMERCIAL

## 2026-01-16 ENCOUNTER — APPOINTMENT (OUTPATIENT)
Dept: PHARMACY | Facility: HOSPITAL | Age: 61
End: 2026-01-16
Payer: COMMERCIAL

## (undated) DEVICE — PHOTONBLADE

## (undated) DEVICE — CABLE, SURGICAL, DISP

## (undated) DEVICE — ELECTRODE, QUICK-COMBO, REDI PACK

## (undated) DEVICE — SUTURE, VICRYL 2-0, TAPER POINT, CT-1 UNDYED 27 INCH

## (undated) DEVICE — CUP, MEDICINE, CLEAR, 2 OZ, STERILE

## (undated) DEVICE — WOUND SYSTEM, DEBRIDEMENT & CLEANING, O.R DUOPAK

## (undated) DEVICE — ENVELOPE, ANTIBACTERIAL, AIGIS RX TYRX, ABSORBABLE, LRG

## (undated) DEVICE — Device

## (undated) DEVICE — SUTURE, V-LOC, 4-0, 12 IN, P-12, 90 ABS

## (undated) DEVICE — COVER, EQUIPMENT, DOME COVER, SNAP CAP, 30 IN, CLEAR, LF

## (undated) DEVICE — ADHESIVE, SKIN, DERMABOND ADVANCED, 15CM, PEN-STYLE

## (undated) DEVICE — SUTURE, VICRYL, 3-0, 27 IN, CT-1, UNDYED

## (undated) DEVICE — DRESSING, AQUACEL AG, HYDROFIBER W/SILVER, 3.5 X 6 IN